# Patient Record
Sex: FEMALE | Race: WHITE | Employment: OTHER | URBAN - METROPOLITAN AREA
[De-identification: names, ages, dates, MRNs, and addresses within clinical notes are randomized per-mention and may not be internally consistent; named-entity substitution may affect disease eponyms.]

---

## 2017-05-23 ENCOUNTER — HOSPITAL ENCOUNTER (OUTPATIENT)
Dept: MAMMOGRAPHY | Age: 72
Discharge: HOME | End: 2017-05-23
Attending: INTERNAL MEDICINE
Payer: MEDICARE

## 2017-05-23 DIAGNOSIS — Z12.31 ENCOUNTER FOR SCREENING MAMMOGRAM FOR BREAST CANCER: ICD-10-CM

## 2017-05-23 PROCEDURE — 77067 SCR MAMMO BI INCL CAD: CPT | Performed by: INTERNAL MEDICINE

## 2017-05-30 ENCOUNTER — APPOINTMENT (OUTPATIENT)
Dept: LAB | Facility: HOSPITAL | Age: 72
End: 2017-05-30
Attending: INTERNAL MEDICINE
Payer: MEDICARE

## 2017-05-30 DIAGNOSIS — E55.9 VITAMIN D DEFICIENCY: ICD-10-CM

## 2017-05-30 DIAGNOSIS — E78.00 PURE HYPERCHOLESTEROLEMIA: ICD-10-CM

## 2017-05-30 PROCEDURE — 80061 LIPID PANEL: CPT

## 2017-05-30 PROCEDURE — 80053 COMPREHEN METABOLIC PANEL: CPT

## 2017-05-30 PROCEDURE — 82306 VITAMIN D 25 HYDROXY: CPT

## 2017-05-30 PROCEDURE — 84443 ASSAY THYROID STIM HORMONE: CPT

## 2017-05-30 PROCEDURE — 36415 COLL VENOUS BLD VENIPUNCTURE: CPT

## 2017-06-05 ENCOUNTER — HOSPITAL ENCOUNTER (OUTPATIENT)
Dept: BONE DENSITY | Age: 72
Discharge: HOME | End: 2017-06-05
Attending: INTERNAL MEDICINE
Payer: MEDICARE

## 2017-06-05 DIAGNOSIS — M81.0 OSTEOPOROSIS: ICD-10-CM

## 2017-06-05 DIAGNOSIS — M81.0 OSTEOPOROSIS, UNSPECIFIED OSTEOPOROSIS TYPE, UNSPECIFIED PATHOLOGICAL FRACTURE PRESENCE: ICD-10-CM

## 2017-06-05 PROCEDURE — 77080 DXA BONE DENSITY AXIAL: CPT | Performed by: INTERNAL MEDICINE

## 2017-11-16 PROCEDURE — 88305 TISSUE EXAM BY PATHOLOGIST: CPT | Performed by: INTERNAL MEDICINE

## 2018-04-11 PROBLEM — M79.662 PAIN OF LEFT CALF: Status: ACTIVE | Noted: 2018-04-11

## 2018-08-14 ENCOUNTER — HOSPITAL ENCOUNTER (OUTPATIENT)
Dept: MAMMOGRAPHY | Age: 73
Discharge: HOME | End: 2018-08-14
Attending: INTERNAL MEDICINE
Payer: MEDICARE

## 2018-08-14 ENCOUNTER — HOSPITAL ENCOUNTER (OUTPATIENT)
Dept: MAMMOGRAPHY | Age: 73
End: 2018-08-14
Attending: INTERNAL MEDICINE
Payer: MEDICARE

## 2018-08-14 DIAGNOSIS — Z12.31 SCREENING MAMMOGRAM, ENCOUNTER FOR: ICD-10-CM

## 2018-08-14 PROCEDURE — 77063 BREAST TOMOSYNTHESIS BI: CPT | Performed by: INTERNAL MEDICINE

## 2018-08-14 PROCEDURE — 77067 SCR MAMMO BI INCL CAD: CPT | Performed by: INTERNAL MEDICINE

## 2018-08-27 RX ORDER — ACETAMINOPHEN 500 MG
500 TABLET ORAL EVERY 6 HOURS PRN
COMMUNITY
End: 2020-11-30

## 2018-08-31 NOTE — PAT NURSING NOTE
Faxed additional testing requirement to ,pcp adding Chest x ray requested by ,surgeon to testing requirements

## 2018-09-06 PROBLEM — M79.662 PAIN OF LEFT CALF: Status: RESOLVED | Noted: 2018-04-11 | Resolved: 2018-09-06

## 2018-09-06 PROCEDURE — 87081 CULTURE SCREEN ONLY: CPT | Performed by: INTERNAL MEDICINE

## 2018-09-21 ENCOUNTER — HOSPITAL ENCOUNTER (OUTPATIENT)
Dept: GENERAL RADIOLOGY | Facility: HOSPITAL | Age: 73
Discharge: HOME | End: 2018-09-21
Attending: INTERNAL MEDICINE
Payer: MEDICARE

## 2018-09-21 DIAGNOSIS — Z01.818 PRE-OP TESTING: ICD-10-CM

## 2018-09-21 PROCEDURE — 71046 X-RAY EXAM CHEST 2 VIEWS: CPT | Performed by: INTERNAL MEDICINE

## 2018-09-24 NOTE — DISCHARGE SUMMARY
Ortho Discharge Summary     Patient ID:  Marci Unger  [id number]  87 year old  12/16/1945      Admit Date: 9/27/2018    Discharge Date and Time: 9/28/2018    Attending Physician: Chivo Torres MD     Reason for admission: right knee DJD    Discharge Alona

## 2018-09-24 NOTE — CM/SW NOTE
SW contacted pt re: upcoming surgery and d/c planning. Pt states that she will have a Right TKA on 9/27. She lives with her , who will be able to assist as needed. She has needed DME- RW, RTS, and cane. Discussed Home PT vs OPPT.   Pt states that

## 2018-09-26 ENCOUNTER — ANESTHESIA EVENT (OUTPATIENT)
Dept: SURGERY | Facility: HOSPITAL | Age: 73
End: 2018-09-26

## 2018-09-27 ENCOUNTER — ANESTHESIA (OUTPATIENT)
Dept: SURGERY | Facility: HOSPITAL | Age: 73
End: 2018-09-27

## 2018-09-27 ENCOUNTER — HOSPITAL ENCOUNTER (INPATIENT)
Facility: HOSPITAL | Age: 73
LOS: 1 days | Discharge: HOME | DRG: 470 | End: 2018-09-28
Attending: ORTHOPAEDIC SURGERY | Admitting: ORTHOPAEDIC SURGERY
Payer: MEDICARE

## 2018-09-27 ENCOUNTER — APPOINTMENT (OUTPATIENT)
Dept: GENERAL RADIOLOGY | Facility: HOSPITAL | Age: 73
DRG: 470 | End: 2018-09-27
Attending: PHYSICIAN ASSISTANT
Payer: MEDICARE

## 2018-09-27 PROBLEM — Z96.659 S/P KNEE REPLACEMENT: Status: ACTIVE | Noted: 2018-09-27

## 2018-09-27 PROCEDURE — 80053 COMPREHEN METABOLIC PANEL: CPT | Performed by: PHYSICIAN ASSISTANT

## 2018-09-27 PROCEDURE — 88311 DECALCIFY TISSUE: CPT | Performed by: ORTHOPAEDIC SURGERY

## 2018-09-27 PROCEDURE — 88305 TISSUE EXAM BY PATHOLOGIST: CPT | Performed by: ORTHOPAEDIC SURGERY

## 2018-09-27 PROCEDURE — 86850 RBC ANTIBODY SCREEN: CPT

## 2018-09-27 PROCEDURE — 0SRC0J9 REPLACEMENT OF RIGHT KNEE JOINT WITH SYNTHETIC SUBSTITUTE, CEMENTED, OPEN APPROACH: ICD-10-PCS | Performed by: ORTHOPAEDIC SURGERY

## 2018-09-27 PROCEDURE — 86901 BLOOD TYPING SEROLOGIC RH(D): CPT

## 2018-09-27 PROCEDURE — 86900 BLOOD TYPING SEROLOGIC ABO: CPT

## 2018-09-27 PROCEDURE — 73560 X-RAY EXAM OF KNEE 1 OR 2: CPT | Performed by: PHYSICIAN ASSISTANT

## 2018-09-27 RX ORDER — MORPHINE SULFATE 4 MG/ML
2 INJECTION, SOLUTION INTRAMUSCULAR; INTRAVENOUS EVERY 5 MIN PRN
Status: DISCONTINUED | OUTPATIENT
Start: 2018-09-27 | End: 2018-09-27 | Stop reason: HOSPADM

## 2018-09-27 RX ORDER — HYDROMORPHONE HYDROCHLORIDE 1 MG/ML
1 INJECTION, SOLUTION INTRAMUSCULAR; INTRAVENOUS; SUBCUTANEOUS EVERY 2 HOUR PRN
Status: DISCONTINUED | OUTPATIENT
Start: 2018-09-27 | End: 2018-09-28

## 2018-09-27 RX ORDER — ACETAMINOPHEN 500 MG
1000 TABLET ORAL ONCE
Status: DISCONTINUED | OUTPATIENT
Start: 2018-09-27 | End: 2018-09-27 | Stop reason: HOSPADM

## 2018-09-27 RX ORDER — DIPHENHYDRAMINE HCL 25 MG
25 CAPSULE ORAL EVERY 4 HOURS PRN
Status: DISCONTINUED | OUTPATIENT
Start: 2018-09-27 | End: 2018-09-28

## 2018-09-27 RX ORDER — HYDROMORPHONE HYDROCHLORIDE 2 MG/1
2 TABLET ORAL EVERY 4 HOURS PRN
Qty: 60 TABLET | Refills: 0 | Status: SHIPPED | OUTPATIENT
Start: 2018-09-27 | End: 2018-10-05

## 2018-09-27 RX ORDER — POLYETHYLENE GLYCOL 3350 17 G/17G
17 POWDER, FOR SOLUTION ORAL DAILY PRN
Status: DISCONTINUED | OUTPATIENT
Start: 2018-09-27 | End: 2018-09-28

## 2018-09-27 RX ORDER — PANTOPRAZOLE SODIUM 40 MG/1
40 TABLET, DELAYED RELEASE ORAL
Qty: 30 TABLET | Refills: 0 | Status: SHIPPED | OUTPATIENT
Start: 2018-09-27 | End: 2019-09-09

## 2018-09-27 RX ORDER — DIPHENHYDRAMINE HYDROCHLORIDE 50 MG/ML
12.5 INJECTION INTRAMUSCULAR; INTRAVENOUS AS NEEDED
Status: DISCONTINUED | OUTPATIENT
Start: 2018-09-27 | End: 2018-09-27 | Stop reason: HOSPADM

## 2018-09-27 RX ORDER — CEFAZOLIN SODIUM/WATER 2 G/20 ML
2 SYRINGE (ML) INTRAVENOUS EVERY 8 HOURS
Status: COMPLETED | OUTPATIENT
Start: 2018-09-27 | End: 2018-09-28

## 2018-09-27 RX ORDER — SODIUM PHOSPHATE, DIBASIC AND SODIUM PHOSPHATE, MONOBASIC 7; 19 G/133ML; G/133ML
1 ENEMA RECTAL ONCE AS NEEDED
Status: DISCONTINUED | OUTPATIENT
Start: 2018-09-27 | End: 2018-09-28

## 2018-09-27 RX ORDER — DOCUSATE SODIUM 100 MG/1
100 CAPSULE, LIQUID FILLED ORAL 2 TIMES DAILY
Qty: 60 CAPSULE | Refills: 2 | Status: SHIPPED | OUTPATIENT
Start: 2018-09-27 | End: 2019-09-09

## 2018-09-27 RX ORDER — MIDAZOLAM HYDROCHLORIDE 1 MG/ML
1 INJECTION INTRAMUSCULAR; INTRAVENOUS EVERY 5 MIN PRN
Status: DISCONTINUED | OUTPATIENT
Start: 2018-09-27 | End: 2018-09-27 | Stop reason: HOSPADM

## 2018-09-27 RX ORDER — IBUPROFEN 200 MG
200 TABLET ORAL
Status: ON HOLD | COMMUNITY
End: 2018-09-28

## 2018-09-27 RX ORDER — DIPHENHYDRAMINE HYDROCHLORIDE 50 MG/ML
25 INJECTION INTRAMUSCULAR; INTRAVENOUS ONCE AS NEEDED
Status: ACTIVE | OUTPATIENT
Start: 2018-09-27 | End: 2018-09-27

## 2018-09-27 RX ORDER — OXYCODONE HYDROCHLORIDE 5 MG/1
TABLET ORAL EVERY 4 HOURS PRN
Qty: 60 TABLET | Refills: 0 | Status: SHIPPED | OUTPATIENT
Start: 2018-09-27 | End: 2018-08-28

## 2018-09-27 RX ORDER — HYDROMORPHONE HYDROCHLORIDE 2 MG/1
2 TABLET ORAL EVERY 4 HOURS PRN
Status: DISCONTINUED | OUTPATIENT
Start: 2018-09-27 | End: 2018-09-28

## 2018-09-27 RX ORDER — CEFAZOLIN SODIUM/WATER 2 G/20 ML
2 SYRINGE (ML) INTRAVENOUS ONCE
Status: COMPLETED | OUTPATIENT
Start: 2018-09-27 | End: 2018-09-27

## 2018-09-27 RX ORDER — ASPIRIN 325 MG
325 TABLET ORAL 2 TIMES DAILY
Status: DISCONTINUED | OUTPATIENT
Start: 2018-09-27 | End: 2018-09-28

## 2018-09-27 RX ORDER — ONDANSETRON 2 MG/ML
4 INJECTION INTRAMUSCULAR; INTRAVENOUS EVERY 4 HOURS PRN
Status: DISCONTINUED | OUTPATIENT
Start: 2018-09-27 | End: 2018-09-28

## 2018-09-27 RX ORDER — METOCLOPRAMIDE HYDROCHLORIDE 5 MG/ML
10 INJECTION INTRAMUSCULAR; INTRAVENOUS EVERY 6 HOURS PRN
Status: DISCONTINUED | OUTPATIENT
Start: 2018-09-27 | End: 2018-09-28

## 2018-09-27 RX ORDER — NALOXONE HYDROCHLORIDE 0.4 MG/ML
80 INJECTION, SOLUTION INTRAMUSCULAR; INTRAVENOUS; SUBCUTANEOUS AS NEEDED
Status: DISCONTINUED | OUTPATIENT
Start: 2018-09-27 | End: 2018-09-27 | Stop reason: HOSPADM

## 2018-09-27 RX ORDER — SODIUM CHLORIDE, SODIUM LACTATE, POTASSIUM CHLORIDE, CALCIUM CHLORIDE 600; 310; 30; 20 MG/100ML; MG/100ML; MG/100ML; MG/100ML
INJECTION, SOLUTION INTRAVENOUS CONTINUOUS
Status: DISCONTINUED | OUTPATIENT
Start: 2018-09-27 | End: 2018-09-28

## 2018-09-27 RX ORDER — DIPHENHYDRAMINE HYDROCHLORIDE 50 MG/ML
12.5 INJECTION INTRAMUSCULAR; INTRAVENOUS EVERY 4 HOURS PRN
Status: DISCONTINUED | OUTPATIENT
Start: 2018-09-27 | End: 2018-09-28

## 2018-09-27 RX ORDER — BISACODYL 10 MG
10 SUPPOSITORY, RECTAL RECTAL
Status: DISCONTINUED | OUTPATIENT
Start: 2018-09-27 | End: 2018-09-28

## 2018-09-27 RX ORDER — ASPIRIN 325 MG
325 TABLET, DELAYED RELEASE (ENTERIC COATED) ORAL 2 TIMES DAILY
Qty: 60 TABLET | Refills: 0 | Status: SHIPPED | OUTPATIENT
Start: 2018-09-27 | End: 2020-09-21 | Stop reason: ALTCHOICE

## 2018-09-27 RX ORDER — HYDROMORPHONE HYDROCHLORIDE 4 MG/1
4 TABLET ORAL EVERY 4 HOURS PRN
Status: DISCONTINUED | OUTPATIENT
Start: 2018-09-27 | End: 2018-09-28

## 2018-09-27 RX ORDER — ONDANSETRON 2 MG/ML
4 INJECTION INTRAMUSCULAR; INTRAVENOUS AS NEEDED
Status: DISCONTINUED | OUTPATIENT
Start: 2018-09-27 | End: 2018-09-27 | Stop reason: HOSPADM

## 2018-09-27 RX ORDER — MELATONIN
325
Status: DISCONTINUED | OUTPATIENT
Start: 2018-09-28 | End: 2018-09-28

## 2018-09-27 RX ORDER — HYDROMORPHONE HYDROCHLORIDE 2 MG/1
TABLET ORAL EVERY 4 HOURS PRN
Status: DISCONTINUED | OUTPATIENT
Start: 2018-09-27 | End: 2018-09-27 | Stop reason: SDUPTHER

## 2018-09-27 RX ORDER — ACETAMINOPHEN 325 MG/1
650 TABLET ORAL EVERY 6 HOURS SCHEDULED
Status: DISCONTINUED | OUTPATIENT
Start: 2018-09-27 | End: 2018-09-28

## 2018-09-27 RX ORDER — SODIUM CHLORIDE, SODIUM LACTATE, POTASSIUM CHLORIDE, CALCIUM CHLORIDE 600; 310; 30; 20 MG/100ML; MG/100ML; MG/100ML; MG/100ML
INJECTION, SOLUTION INTRAVENOUS CONTINUOUS
Status: DISCONTINUED | OUTPATIENT
Start: 2018-09-27 | End: 2018-09-27 | Stop reason: HOSPADM

## 2018-09-27 RX ORDER — HYDROMORPHONE HYDROCHLORIDE 1 MG/ML
0.5 INJECTION, SOLUTION INTRAMUSCULAR; INTRAVENOUS; SUBCUTANEOUS EVERY 2 HOUR PRN
Status: DISCONTINUED | OUTPATIENT
Start: 2018-09-27 | End: 2018-09-28

## 2018-09-27 RX ORDER — ONDANSETRON 4 MG/1
4 TABLET, FILM COATED ORAL EVERY 8 HOURS PRN
Qty: 30 TABLET | Refills: 0 | Status: SHIPPED | OUTPATIENT
Start: 2018-09-27 | End: 2018-10-05

## 2018-09-27 RX ORDER — DOCUSATE SODIUM 100 MG/1
100 CAPSULE, LIQUID FILLED ORAL 2 TIMES DAILY
Status: DISCONTINUED | OUTPATIENT
Start: 2018-09-27 | End: 2018-09-28

## 2018-09-27 NOTE — ANESTHESIA PREPROCEDURE EVALUATION
PRE-OP EVALUATION    Patient Name: Heidi Dunn    Pre-op Diagnosis: RIGHT KNEE DEGENERATIVE JOINT DISEASE     Procedure(s):  RIGHT TOTAL KNEE ARTHROPLASTY    Surgeon(s) and Role: Kike Nguyen MD - Primary    Pre-op vitals reviewed.         Body m Surgeon: Savanna Todd MD;                 Location: [address] [place] [address]  2003: COLONOSCOPY & POLYPECTOMY  11/16/2017: COLONOSCOPY, POSSIBLE BIOPSY, POSSIBLE POLYPECTOMY [id number];    N/A      Comment:  Performed by Savanna Todd MD at [address] [facility]

## 2018-09-27 NOTE — OPERATIVE REPORT
[address] [place] [address]\R OPERATIVE REPORT\b PATIENT NAME: Augusta West MR#: [id number]\N PMD: Brandon Vergara DATE OF PROCEDURE: 9/27/2018\b PREOPERATIVE DIAGNOSIS: Degenerative Arthritis right knee\b POSTOPERATIVE Thursday, September 27, 2018. Following proper identification in the preoperative holding area with confirmation of the above-stated procedure, the patient was brought to the main operating room suite. Procedure was confirmed.  She received 2 grams of Cefaz medial and middle third of the tibial tubercle. This was provisionally pinned. A size 6 right EMPOWR 3D Non-Porous Femur femoral component was placed along with a 10 mm polyethylene spacer.  With this in place, the knee was able to reach full extension and

## 2018-09-27 NOTE — PLAN OF CARE
Pt arrived from PACU on bed. VSS. Rates pain severe, called pharmacy to verify and send IV Dilaudid. Voided and had BM upon arrival to floor via bed pain. Denies nausea. Family at bedside. Oriented to room and call light system.  Educated on fall precaution

## 2018-09-27 NOTE — H&P
History and Physical: Brief Update    I have reviewed the history and physical performed within the last 30 days, Dr. Crispin Mays on 9/6/18. I agree with this assessment and have no further additions. The consent form is signed and present in the chart.   Flores Rodriguez

## 2018-09-27 NOTE — ANESTHESIA POSTPROCEDURE EVALUATION
[facility] Patient Status:  Outpatient in a Bed   Age/Gender 71year old female MRN [id number]   Location [facility] Attending Jose A Felix MD   Hosp Day # 0 PCP Lacy Dixon MD       Anesthesia Post-op

## 2018-09-28 VITALS
HEIGHT: 68.5 IN | BODY MASS INDEX: 36.79 KG/M2 | SYSTOLIC BLOOD PRESSURE: 135 MMHG | HEART RATE: 78 BPM | TEMPERATURE: 98 F | WEIGHT: 245.56 LBS | OXYGEN SATURATION: 92 % | DIASTOLIC BLOOD PRESSURE: 76 MMHG | RESPIRATION RATE: 18 BRPM

## 2018-09-28 PROCEDURE — 97161 PT EVAL LOW COMPLEX 20 MIN: CPT

## 2018-09-28 PROCEDURE — 90471 IMMUNIZATION ADMIN: CPT

## 2018-09-28 PROCEDURE — 85027 COMPLETE CBC AUTOMATED: CPT | Performed by: PHYSICIAN ASSISTANT

## 2018-09-28 PROCEDURE — 97150 GROUP THERAPEUTIC PROCEDURES: CPT

## 2018-09-28 PROCEDURE — 97116 GAIT TRAINING THERAPY: CPT

## 2018-09-28 PROCEDURE — 97530 THERAPEUTIC ACTIVITIES: CPT

## 2018-09-28 NOTE — PHYSICAL THERAPY NOTE
PHYSICAL THERAPY KNEE EVALUATION - INPATIENT     Room Number: 368/368-A  Evaluation Date: 9/28/2018  Type of Evaluation: Initial  Physician Order: PT Eval and Treat    Presenting Problem: R TKA  Reason for Therapy: Mobility Dysfunction and Discharge Planni spouse. SUBJECTIVE  \"I'm over the crippling fear I had yesterday and ready to move\"    Patient self-stated goal is go home.     OBJECTIVE  Precautions: None  Fall Risk: High fall risk    WEIGHT BEARING RESTRICTION  Weight Bearing Restriction: R lower e Scale): 56.93   CMS Modifier (G-Code): CI    FUNCTIONAL ABILITY STATUS  Gait Assessment   Gait Assistance: Minimum assistance(actual: CGA)  Distance (ft): 150ft x2  Assistive Device: Rolling walker  Pattern: R Decreased stance time  Stoop/Curb Assistance: motion;Strengthening;Stair training;Transfer training;Balance training;Stoop training  Rehab Potential : Good  Frequency (Obs): BID  Number of Visits to Meet Established Goals: 3      CURRENT GOALS   Goal #1    Patient is able to demonstrate supine - sit E

## 2018-09-28 NOTE — CM/SW NOTE
09/28/18 1000   Discharge disposition   Expected discharge disposition Home or Self   Outpatient services Physical therapy   Discharge transportation Private car

## 2018-09-28 NOTE — CM/SW NOTE
Spoke with pt- she notes that she will go to OPPT and not have home PT first.  She has appt set to start on Monday. She voices some concern about the dressing change at home but  reassured her that he will take care of this.     [facility]- Jennifer notif

## 2018-09-28 NOTE — PROGRESS NOTES
Reviewed indications, side effects of pain medication/narcotics and constipation prevention.  Stressed importance of increased fluids/roughage in diet, continued use of stool softeners along with laxatives and suppositories as needed while taking narcotics

## 2018-09-28 NOTE — PLAN OF CARE
Pt discharge education completed with pt and spouse. All questions addressed. All pt belongings packed and sent with pt. Scripts delivered by [employer]. Discharged home with Outpt PT via personal car.

## 2018-09-28 NOTE — PHYSICAL THERAPY NOTE
PHYSICAL THERAPY KNEE TREATMENT NOTE - INPATIENT     Room Number: 368/368-A     Session: 1   Number of Visits to Meet Established Goals: 3    Presenting Problem: R TKA  Reason for Therapy: Mobility Dysfunction and Discharge Planning     History related to extremity        R Lower Extremity: Weight Bearing as Tolerated       PAIN ASSESSMENT   Ratin  Location: R knee  Management Techniques: Activity promotion; Body mechanics    BALANCE  Static Sitting: Good  Dynamic Sitting: Good  Static [facility]  Dy reps  reps   SLR 10 reps  reps   Sitting Knee Flexion 10 reps  reps   Standing heel/toe raises 10 reps  reps   Standing knee flexion 10 reps  reps   Extension stretch  1x 1x     Comments: Pt participated in group session, tolerance was good.    was pre

## 2018-09-28 NOTE — CONSULTS
[facility] hospitalist initial consult note  PCP Brittney Payne MD  Consulted at the request of Dr. Daniele Ricardo  Reason for consult medical co-management  HPI 72 yo female with many medical problems including but not limited to OA, anemia here s/p right cemented total kn Marital status:       Spouse name: Not on file      Number of children: 2      Years of education: Not on file      Highest education level: Not on file    Social Needs      Financial resource strain: Not on file      Food insecurity - worry: Not mg by mouth every 4 to 6 hours as needed for Pain. Disp:  Rfl:    Cholecalciferol (VITAMIN D) 1000 UNITS Oral Tab Take 1 tablet by mouth daily.  Disp:  Rfl:    Calcium Carbonate-Vitamin D (CALCIUM-D) 600-400 MG-UNIT Oral Tab Take by mouth 2 (two) times artie

## 2018-09-28 NOTE — PROGRESS NOTES
[facility]    Progress Note    Ross Salmon Patient Status:  Outpatient in a Bed    1945 MRN [id number]   [facility] 3SW-A Attending Teri Shields MD   Hosp Day # 0 PCP Adolfo Leahy MD       Subjective:   Ross Salmon is a(n) documentation in H+P. Based on patients current state of illness, I anticipate that, after discharge, patient will require TBD.         Alvin Erickson MD  9/28/2018    Dr. Alvin Erickson MD  Adult Joint Replacement and Reconstruction  [facility]

## 2018-10-01 ENCOUNTER — HOSPITAL ENCOUNTER (OUTPATIENT)
Dept: PHYSICAL THERAPY | Facility: HOSPITAL | Age: 73
Setting detail: THERAPIES SERIES
Discharge: HOME | End: 2018-10-01
Attending: ORTHOPAEDIC SURGERY
Payer: MEDICARE

## 2018-10-01 PROCEDURE — 97163 PT EVAL HIGH COMPLEX 45 MIN: CPT

## 2018-10-01 NOTE — PROGRESS NOTES
LOWER EXTREMITY EVALUATION:   Referring Physician: Dr. Kary Pineda  Diagnosis: s/p R TKA on 9/27/18     Date of Service: 10/1/2018     PATIENT SUMMARY   Erika Diaz is a 71year old female presenting to PT with complaints of R knee pain s/p R TKA on 9/27/18 126  Extension: R 15; L 4        Strength/MMT:   Hip Knee Foot/Ankle   Flexion: R 3/5; L 4-/5  Abduction: R 5/5; L 5/5 Flexion: R 4/5; L 5/5  Extension: R 4/5; L 5/5    DF: R 4+/5; L 4+/5     Balance: SLS: R 0 sec, L 1 sec    Today’s Treatment and Response [phone]    Sincerely,  Electronically signed by therapist: Aristeo Latham, PT    [de-identified] certification required: Yes  I certify the need for these services furnished under this plan of treatment and while under my care.     X______________________

## 2018-10-02 ENCOUNTER — HOSPITAL ENCOUNTER (OUTPATIENT)
Dept: PHYSICAL THERAPY | Facility: HOSPITAL | Age: 73
Setting detail: THERAPIES SERIES
Discharge: HOME | End: 2018-10-02
Attending: ORTHOPAEDIC SURGERY
Payer: MEDICARE

## 2018-10-02 PROCEDURE — 97140 MANUAL THERAPY 1/> REGIONS: CPT

## 2018-10-02 PROCEDURE — 97110 THERAPEUTIC EXERCISES: CPT

## 2018-10-02 NOTE — PROGRESS NOTES
Dx: s/p R TKA on 9/27/18         Authorized # of Visits:  Medicare         Next MD visit: none scheduled  Fall Risk: standard         Precautions: n/a             Subjective: Patient reports she had a lot of pain last night and some difficulty sleeping.

## 2018-10-04 ENCOUNTER — HOSPITAL ENCOUNTER (OUTPATIENT)
Dept: PHYSICAL THERAPY | Facility: HOSPITAL | Age: 73
Setting detail: THERAPIES SERIES
Discharge: HOME | End: 2018-10-04
Attending: ORTHOPAEDIC SURGERY
Payer: MEDICARE

## 2018-10-04 PROCEDURE — 97140 MANUAL THERAPY 1/> REGIONS: CPT

## 2018-10-04 PROCEDURE — 97110 THERAPEUTIC EXERCISES: CPT

## 2018-10-04 NOTE — PROGRESS NOTES
Dx: s/p R TKA on 9/27/18         Authorized # of Visits:  Medicare         Next MD visit: none scheduled  Fall Risk: standard         Precautions: n/a             Subjective: Patient reports she is feeling a little stiff this morning.     Objective:   TE mack

## 2018-10-05 ENCOUNTER — HOSPITAL ENCOUNTER (OUTPATIENT)
Dept: GENERAL RADIOLOGY | Age: 73
Discharge: HOME | End: 2018-10-05
Attending: INTERNAL MEDICINE
Payer: MEDICARE

## 2018-10-05 ENCOUNTER — HOSPITAL ENCOUNTER (OUTPATIENT)
Dept: PHYSICAL THERAPY | Facility: HOSPITAL | Age: 73
Setting detail: THERAPIES SERIES
Discharge: HOME | End: 2018-10-05
Attending: ORTHOPAEDIC SURGERY
Payer: MEDICARE

## 2018-10-05 DIAGNOSIS — R52 PAIN: ICD-10-CM

## 2018-10-05 PROCEDURE — 73630 X-RAY EXAM OF FOOT: CPT | Performed by: INTERNAL MEDICINE

## 2018-10-05 PROCEDURE — 97140 MANUAL THERAPY 1/> REGIONS: CPT

## 2018-10-05 PROCEDURE — 97110 THERAPEUTIC EXERCISES: CPT

## 2018-10-05 NOTE — PROGRESS NOTES
Dx: s/p R TKA on 9/27/18         Authorized # of Visits:  Medicare         Next MD visit: none scheduled  Fall Risk: standard         Precautions: n/a             Subjective: Patient reports she was walking on the sidewalk and twisted her L  foot yesterday tband hooklying clamshells 2x20       Ice with elevation x10' Ice with elevation x10' Ice with elevation x10'                Skilled Services: HEP in bold.     Charges: manual x1, therex x2       Total Timed Treatment: 40 min  Total Treatment Time: 50 min

## 2018-10-08 ENCOUNTER — APPOINTMENT (OUTPATIENT)
Dept: PHYSICAL THERAPY | Facility: HOSPITAL | Age: 73
End: 2018-10-08
Attending: ORTHOPAEDIC SURGERY
Payer: MEDICARE

## 2018-10-08 ENCOUNTER — HOSPITAL ENCOUNTER (OUTPATIENT)
Dept: PHYSICAL THERAPY | Facility: HOSPITAL | Age: 73
Setting detail: THERAPIES SERIES
Discharge: HOME | End: 2018-10-08
Attending: ORTHOPAEDIC SURGERY
Payer: MEDICARE

## 2018-10-08 PROBLEM — S92.355B: Status: ACTIVE | Noted: 2018-10-08

## 2018-10-08 PROCEDURE — 97110 THERAPEUTIC EXERCISES: CPT

## 2018-10-08 PROCEDURE — 97140 MANUAL THERAPY 1/> REGIONS: CPT

## 2018-10-08 NOTE — PROGRESS NOTES
Dx: s/p R TKA on 9/27/18         Authorized # of Visits:  Medicare         Next MD visit: none scheduled  Fall Risk: standard         Precautions: n/a             Subjective: Patient reports she had an x-ray of her left foot and met with a podiatrist and s SB DKTC x20 SB DKTC x20 SB DKTC x20 Patellar mobs all planes      SLR 2x10 SLR 2x10 SLR 2x10 Knee flexion PROM      Supine marching 2x1' Supine marching 2x1' Supine marching 2x1' Knee ext mobs grade 2-3      Quad sets over bolster 2x20 2 sec holds Quad s

## 2018-10-09 ENCOUNTER — HOSPITAL ENCOUNTER (OUTPATIENT)
Dept: PHYSICAL THERAPY | Facility: HOSPITAL | Age: 73
Setting detail: THERAPIES SERIES
Discharge: HOME | End: 2018-10-09
Attending: ORTHOPAEDIC SURGERY
Payer: MEDICARE

## 2018-10-09 PROCEDURE — 97110 THERAPEUTIC EXERCISES: CPT

## 2018-10-09 PROCEDURE — 97140 MANUAL THERAPY 1/> REGIONS: CPT

## 2018-10-09 PROCEDURE — 97016 VASOPNEUMATIC DEVICE THERAPY: CPT

## 2018-10-09 NOTE — PROGRESS NOTES
Dx: s/p R TKA on 9/27/18         Authorized # of Visits:  Medicare         Next MD visit: none scheduled  Fall Risk: standard         Precautions: n/a             Subjective: Patient states she is a little sore today because therapy was quite aggressive ye 2x20 2 sec holds Supine HS sets over bolster 2x20 2 sec holds Supine HS sets over bolster 2x20 2 sec holds Self gastroc stretch with DKSA strap 2x30\" Supine bridges 2x20     Ball hip adduction x20 2 sec holds Ball hip adduction x20 2 sec holds Ball hip ad

## 2018-10-11 ENCOUNTER — HOSPITAL ENCOUNTER (OUTPATIENT)
Dept: PHYSICAL THERAPY | Facility: HOSPITAL | Age: 73
Setting detail: THERAPIES SERIES
Discharge: HOME | End: 2018-10-11
Attending: ORTHOPAEDIC SURGERY
Payer: MEDICARE

## 2018-10-11 PROCEDURE — 97016 VASOPNEUMATIC DEVICE THERAPY: CPT

## 2018-10-11 PROCEDURE — 97110 THERAPEUTIC EXERCISES: CPT

## 2018-10-11 PROCEDURE — 97140 MANUAL THERAPY 1/> REGIONS: CPT

## 2018-10-11 NOTE — PROGRESS NOTES
Dx: s/p R TKA on 9/27/18         Authorized # of Visits:  Medicare         Next MD visit: none scheduled  Fall Risk: standard         Precautions: n/a             Subjective: Patient states she did not sleep well last night and is feeling sore today.     Ob sec holds Quad sets over bolster 2x20 2 sec holds 90/90 HSS 2x30\" Seated quad sets 2x20 2 sec holds Seated quad sets 2x20 2 sec holds    Supine HS sets over bolster 2x20 2 sec holds Supine HS sets over bolster 2x20 2 sec holds Supine HS sets over bolster

## 2018-10-15 ENCOUNTER — HOSPITAL ENCOUNTER (OUTPATIENT)
Dept: PHYSICAL THERAPY | Facility: HOSPITAL | Age: 73
Setting detail: THERAPIES SERIES
Discharge: HOME | End: 2018-10-15
Attending: ORTHOPAEDIC SURGERY
Payer: MEDICARE

## 2018-10-15 PROCEDURE — 97140 MANUAL THERAPY 1/> REGIONS: CPT

## 2018-10-15 PROCEDURE — 97110 THERAPEUTIC EXERCISES: CPT

## 2018-10-15 PROCEDURE — 97116 GAIT TRAINING THERAPY: CPT

## 2018-10-15 PROCEDURE — 97016 VASOPNEUMATIC DEVICE THERAPY: CPT

## 2018-10-15 NOTE — PROGRESS NOTES
Dx: s/p R TKA on 9/27/18         Authorized # of Visits:  Medicare         Next MD visit: none scheduled  Fall Risk: standard         Precautions: n/a             Subjective: Patient reports she has been having trouble transitioning to the cane instead of marching 2x1' Seated marching 2x1' Seated marching 2x1'   Quad sets over bolster 2x20 2 sec holds Quad sets over bolster 2x20 2 sec holds Quad sets over bolster 2x20 2 sec holds 90/90 HSS 2x30\" Seated quad sets 2x20 2 sec holds Seated quad sets 2x20 2 sec

## 2018-10-16 ENCOUNTER — HOSPITAL ENCOUNTER (OUTPATIENT)
Dept: PHYSICAL THERAPY | Facility: HOSPITAL | Age: 73
Setting detail: THERAPIES SERIES
Discharge: HOME | End: 2018-10-16
Attending: INTERNAL MEDICINE
Payer: MEDICARE

## 2018-10-16 PROCEDURE — 97140 MANUAL THERAPY 1/> REGIONS: CPT

## 2018-10-16 PROCEDURE — 97112 NEUROMUSCULAR REEDUCATION: CPT

## 2018-10-16 PROCEDURE — 97110 THERAPEUTIC EXERCISES: CPT

## 2018-10-16 NOTE — PROGRESS NOTES
Dx: s/p R TKA on 9/27/18         Authorized # of Visits:  Medicare         Next MD visit: none scheduled  Fall Risk: standard         Precautions: n/a             Subjective: Patient reports she has been practicing using the cane in her R hand and feels mu x20   SLR 2x10 SLR 2x10 SLR 2x10 Knee flexion PROM SLR 2x20 Seated SLR 2x20     Supine marching 2x1' Supine marching 2x1' Supine marching 2x1' Knee ext mobs grade 2-3 Seated marching 2x1' Seated marching 2x1' Seated marching 2x1'    Quad sets over bolster

## 2018-10-18 ENCOUNTER — APPOINTMENT (OUTPATIENT)
Dept: PHYSICAL THERAPY | Facility: HOSPITAL | Age: 73
End: 2018-10-18
Payer: MEDICARE

## 2018-10-19 ENCOUNTER — HOSPITAL ENCOUNTER (OUTPATIENT)
Dept: PHYSICAL THERAPY | Facility: HOSPITAL | Age: 73
Setting detail: THERAPIES SERIES
Discharge: HOME | End: 2018-10-19
Attending: INTERNAL MEDICINE
Payer: MEDICARE

## 2018-10-19 PROCEDURE — 97110 THERAPEUTIC EXERCISES: CPT

## 2018-10-19 PROCEDURE — 97112 NEUROMUSCULAR REEDUCATION: CPT

## 2018-10-19 PROCEDURE — 97140 MANUAL THERAPY 1/> REGIONS: CPT

## 2018-10-19 PROCEDURE — 97016 VASOPNEUMATIC DEVICE THERAPY: CPT

## 2018-10-19 NOTE — PROGRESS NOTES
Dx: s/p R TKA on 9/27/18         Authorized # of Visits:  Medicare         Next MD visit: none scheduled  Fall Risk: standard         Precautions: n/a             Subjective: Patient reports she is having difficulty sleeping at night due to pain in the kne SB DKTC x20   SLR 2x10 SLR 2x10 SLR 2x10 Knee flexion PROM SLR 2x20 Seated SLR 2x20      Supine marching 2x1' Supine marching 2x1' Supine marching 2x1' Knee ext mobs grade 2-3 Seated marching 2x1' Seated marching 2x1' Seated marching 2x1'     Quad sets ove sidestepping, backwards walking x10' total   Skilled Services: HEP in bold.     Charges: manual x1, therex x1, neuro x1, game ready x1       Total Timed Treatment: 45 min  Total Treatment Time: 60 min

## 2018-10-23 ENCOUNTER — HOSPITAL ENCOUNTER (OUTPATIENT)
Dept: PHYSICAL THERAPY | Facility: HOSPITAL | Age: 73
Setting detail: THERAPIES SERIES
Discharge: HOME | End: 2018-10-23
Attending: INTERNAL MEDICINE
Payer: MEDICARE

## 2018-10-23 PROCEDURE — 97140 MANUAL THERAPY 1/> REGIONS: CPT

## 2018-10-23 PROCEDURE — 97112 NEUROMUSCULAR REEDUCATION: CPT

## 2018-10-23 PROCEDURE — 97110 THERAPEUTIC EXERCISES: CPT

## 2018-10-23 PROCEDURE — 97016 VASOPNEUMATIC DEVICE THERAPY: CPT

## 2018-10-23 NOTE — PROGRESS NOTES
Dx: s/p R TKA on 9/27/18         Authorized # of Visits:  Medicare         Next MD visit: none scheduled  Fall Risk: standard         Precautions: n/a             Subjective: Patient reports she over-exerted herself over the weekend by going on a mile long x15' total Manual knee flexion and extension stretching x15' total   SB DKTC x20 SB DKTC x20 SB DKTC x20 Patellar mobs all planes SB DKTC x20 SB DKTC x20 SB DKTC x20 SB DKTC x20 SB DKTC x20 SB DKTC x20   SLR 2x10 SLR 2x10 SLR 2x10 Knee flexion PROM SLR 2x2 Static balance ex: tandem stance x4' total Static balance ex: tandem stance x4' total          Ball throw and catch in tandem stance x3'            airex marching x1' airex marching x1' airex marching 2x1'  airex step ups x20          Dynamic balance ex: h

## 2018-10-25 ENCOUNTER — APPOINTMENT (OUTPATIENT)
Dept: PHYSICAL THERAPY | Facility: HOSPITAL | Age: 73
End: 2018-10-25
Payer: MEDICARE

## 2018-10-30 ENCOUNTER — APPOINTMENT (OUTPATIENT)
Dept: PHYSICAL THERAPY | Facility: HOSPITAL | Age: 73
End: 2018-10-30
Payer: MEDICARE

## 2018-11-01 ENCOUNTER — APPOINTMENT (OUTPATIENT)
Dept: PHYSICAL THERAPY | Facility: HOSPITAL | Age: 73
End: 2018-11-01
Payer: MEDICARE

## 2018-11-06 ENCOUNTER — APPOINTMENT (OUTPATIENT)
Dept: PHYSICAL THERAPY | Facility: HOSPITAL | Age: 73
End: 2018-11-06
Payer: MEDICARE

## 2018-11-08 ENCOUNTER — APPOINTMENT (OUTPATIENT)
Dept: PHYSICAL THERAPY | Facility: HOSPITAL | Age: 73
End: 2018-11-08
Payer: MEDICARE

## 2019-04-30 ENCOUNTER — LAB ENCOUNTER (OUTPATIENT)
Dept: LAB | Facility: HOSPITAL | Age: 74
End: 2019-04-30
Attending: PHYSICIAN ASSISTANT
Payer: MEDICARE

## 2019-04-30 DIAGNOSIS — E55.9 VITAMIN D DEFICIENCY: ICD-10-CM

## 2019-04-30 DIAGNOSIS — Z87.39 HISTORY OF OSTEOPOROSIS: ICD-10-CM

## 2019-04-30 DIAGNOSIS — E34.9 INCREASED PTH LEVEL: ICD-10-CM

## 2019-04-30 PROCEDURE — 80053 COMPREHEN METABOLIC PANEL: CPT

## 2019-04-30 PROCEDURE — 36415 COLL VENOUS BLD VENIPUNCTURE: CPT

## 2019-04-30 PROCEDURE — 83970 ASSAY OF PARATHORMONE: CPT

## 2019-04-30 PROCEDURE — 82306 VITAMIN D 25 HYDROXY: CPT

## 2019-05-21 NOTE — PROGRESS NOTES
Lab results reviewed. Calcium and vitamin D labs are within normal limits. Patient to continue current vitamin D supplement long term. PTH level normalized with vitamin D supplementation.

## 2019-06-18 PROBLEM — N39.3 STRESS INCONTINENCE: Status: ACTIVE | Noted: 2019-06-18

## 2019-06-18 PROBLEM — N32.81 OAB (OVERACTIVE BLADDER): Status: ACTIVE | Noted: 2019-06-18

## 2019-06-18 PROBLEM — N39.41 URGE INCONTINENCE: Status: ACTIVE | Noted: 2019-06-18

## 2019-09-09 PROBLEM — R60.0 LOCALIZED EDEMA: Status: ACTIVE | Noted: 2019-09-09

## 2019-09-09 PROBLEM — S92.355B: Status: RESOLVED | Noted: 2018-10-08 | Resolved: 2019-09-09

## 2019-09-09 PROBLEM — R12 HEARTBURN: Status: ACTIVE | Noted: 2019-09-09

## 2020-09-21 PROBLEM — N32.81 OAB (OVERACTIVE BLADDER): Status: RESOLVED | Noted: 2019-06-18 | Resolved: 2020-09-21

## 2020-09-21 PROBLEM — N39.41 URGE INCONTINENCE: Status: RESOLVED | Noted: 2019-06-18 | Resolved: 2020-09-21

## 2020-09-21 PROBLEM — N39.3 STRESS INCONTINENCE: Status: RESOLVED | Noted: 2019-06-18 | Resolved: 2020-09-21

## 2020-09-21 PROBLEM — R60.0 LOCALIZED EDEMA: Status: RESOLVED | Noted: 2019-09-09 | Resolved: 2020-09-21

## 2021-07-21 ENCOUNTER — ORDER TRANSCRIPTION (OUTPATIENT)
Dept: PHYSICAL THERAPY | Facility: HOSPITAL | Age: 76
End: 2021-07-21

## 2021-07-21 DIAGNOSIS — M75.101 ROTATOR CUFF SYNDROME OF RIGHT SHOULDER: ICD-10-CM

## 2021-07-21 DIAGNOSIS — M25.511 RIGHT SHOULDER PAIN, UNSPECIFIED CHRONICITY: Primary | ICD-10-CM

## 2021-08-13 ENCOUNTER — OFFICE VISIT (OUTPATIENT)
Dept: PHYSICAL THERAPY | Facility: HOSPITAL | Age: 76
End: 2021-08-13
Attending: ORTHOPAEDIC SURGERY
Payer: MEDICARE

## 2021-08-13 DIAGNOSIS — M75.101 ROTATOR CUFF SYNDROME OF RIGHT SHOULDER: ICD-10-CM

## 2021-08-13 DIAGNOSIS — M25.511 RIGHT SHOULDER PAIN, UNSPECIFIED CHRONICITY: ICD-10-CM

## 2021-08-13 PROCEDURE — 97162 PT EVAL MOD COMPLEX 30 MIN: CPT

## 2021-08-13 PROCEDURE — 97110 THERAPEUTIC EXERCISES: CPT

## 2021-08-13 NOTE — PROGRESS NOTES
SHOULDER EVALUATION:   Referring Physician: Dr. Manuel Mora  Diagnosis:  Right rotator cuff syndrome.       Date of Service: 8/13/2021     PATIENT SUMMARY   Jose Carmona is a 77year old female who presents to therapy today with complaints of patient ex reaching, lifting/carrying things, hand behind back and reaching out, and with UE dressing. Signs and symptoms are consistent with diagnosis of rtc impingement syndrome, r/o rtc tear. Pt and PT discussed evaluation findings, pathology, POC and HEP.   Pt vo 3+ personal factors/comorbidities, 4+ body structures involved/activity limitations, and unstable symptoms including changing pain levels.   PLAN OF CARE:    Goals: (to be met in 10 visits)   Improved right shoulder elevation ROM from baseline allowing repo

## 2021-08-16 ENCOUNTER — OFFICE VISIT (OUTPATIENT)
Dept: PHYSICAL THERAPY | Facility: HOSPITAL | Age: 76
End: 2021-08-16
Attending: ORTHOPAEDIC SURGERY
Payer: MEDICARE

## 2021-08-16 DIAGNOSIS — M75.101 ROTATOR CUFF SYNDROME OF RIGHT SHOULDER: ICD-10-CM

## 2021-08-16 DIAGNOSIS — M25.511 RIGHT SHOULDER PAIN, UNSPECIFIED CHRONICITY: ICD-10-CM

## 2021-08-16 PROCEDURE — 97110 THERAPEUTIC EXERCISES: CPT

## 2021-08-16 PROCEDURE — 97140 MANUAL THERAPY 1/> REGIONS: CPT

## 2021-08-16 NOTE — PROGRESS NOTES
Dx: Right rotator cuff syndrome.           Insurance (Authorized # of Visits):  8           Authorizing Physician: Dr. Lyudmila Holland  Next MD visit: none scheduled  Fall Risk: standard         Precautions: n/a             Subjective: Right shoulder pain has r

## 2021-08-20 ENCOUNTER — OFFICE VISIT (OUTPATIENT)
Dept: PHYSICAL THERAPY | Facility: HOSPITAL | Age: 76
End: 2021-08-20
Attending: ORTHOPAEDIC SURGERY
Payer: MEDICARE

## 2021-08-20 DIAGNOSIS — M25.511 RIGHT SHOULDER PAIN, UNSPECIFIED CHRONICITY: ICD-10-CM

## 2021-08-20 DIAGNOSIS — M75.101 ROTATOR CUFF SYNDROME OF RIGHT SHOULDER: ICD-10-CM

## 2021-08-20 PROCEDURE — 97140 MANUAL THERAPY 1/> REGIONS: CPT

## 2021-08-20 PROCEDURE — 97110 THERAPEUTIC EXERCISES: CPT

## 2021-08-20 NOTE — PROGRESS NOTES
Dx: Right rotator cuff syndrome.           Insurance (Authorized # of Visits):  8           Authorizing Physician: Dr. Amarjit Freire  Next MD visit: none scheduled  Fall Risk: standard         Precautions: n/a             Subjective: Right shoulder pain level HEP: supine IR/ER actively and with wand, self inferior gliding, AA SF supine, and B SR. Charges: Terrence Templeton.        Total Timed Treatment: 40 min  Total Treatment Time: 40 min

## 2021-08-23 ENCOUNTER — OFFICE VISIT (OUTPATIENT)
Dept: PHYSICAL THERAPY | Facility: HOSPITAL | Age: 76
End: 2021-08-23
Attending: ORTHOPAEDIC SURGERY
Payer: MEDICARE

## 2021-08-23 DIAGNOSIS — M25.511 RIGHT SHOULDER PAIN, UNSPECIFIED CHRONICITY: ICD-10-CM

## 2021-08-23 DIAGNOSIS — M75.101 ROTATOR CUFF SYNDROME OF RIGHT SHOULDER: ICD-10-CM

## 2021-08-23 PROCEDURE — 97140 MANUAL THERAPY 1/> REGIONS: CPT

## 2021-08-23 PROCEDURE — 97110 THERAPEUTIC EXERCISES: CPT

## 2021-08-23 NOTE — PROGRESS NOTES
Dx: Right rotator cuff syndrome.           Insurance (Authorized # of Visits):  8           Authorizing Physician: Dr. Jagjit Mast  Next MD visit: none scheduled  Fall Risk: standard         Precautions: n/a             Subjective: Right shoulder pain is a capsule stretching by PT. Supine right shoulder posterior capsule stretching by PT. Supine isometric R IR and ER gently:  5 second holds 2 x 8 each direction. Gentle isometric IR and ER while supine:   5 second holds 2 x 8 each direction R. ..  Supine

## 2021-08-27 ENCOUNTER — OFFICE VISIT (OUTPATIENT)
Dept: PHYSICAL THERAPY | Facility: HOSPITAL | Age: 76
End: 2021-08-27
Attending: ORTHOPAEDIC SURGERY
Payer: MEDICARE

## 2021-08-27 DIAGNOSIS — M75.101 ROTATOR CUFF SYNDROME OF RIGHT SHOULDER: ICD-10-CM

## 2021-08-27 DIAGNOSIS — M25.511 RIGHT SHOULDER PAIN, UNSPECIFIED CHRONICITY: ICD-10-CM

## 2021-08-27 PROCEDURE — 97140 MANUAL THERAPY 1/> REGIONS: CPT

## 2021-08-27 PROCEDURE — 97110 THERAPEUTIC EXERCISES: CPT

## 2021-08-27 NOTE — PROGRESS NOTES
Dx: Right rotator cuff syndrome.           Insurance (Authorized # of Visits):  8           Authorizing Physician: Dr. Fransico Leroy  Next MD visit: none scheduled  Fall Risk: standard         Precautions: n/a             Subjective:  Slept well last night an [address] posterior and caudal mobs by PT. Grade II and III R GH posterior and caudal mobs by PT. Grade II and III R GH posterior and caudal mobs by PT.     R shoulder PROM by PT. PROM R shoulder by PT. Lots of PROM right shoulder by PT.   PROM right shoulder

## 2021-08-30 ENCOUNTER — OFFICE VISIT (OUTPATIENT)
Dept: PHYSICAL THERAPY | Facility: HOSPITAL | Age: 76
End: 2021-08-30
Attending: ORTHOPAEDIC SURGERY
Payer: MEDICARE

## 2021-08-30 DIAGNOSIS — M75.101 ROTATOR CUFF SYNDROME OF RIGHT SHOULDER: ICD-10-CM

## 2021-08-30 DIAGNOSIS — M25.511 RIGHT SHOULDER PAIN, UNSPECIFIED CHRONICITY: ICD-10-CM

## 2021-08-30 PROCEDURE — 97110 THERAPEUTIC EXERCISES: CPT

## 2021-08-30 PROCEDURE — 97140 MANUAL THERAPY 1/> REGIONS: CPT

## 2021-08-30 NOTE — PROGRESS NOTES
Dx: Right rotator cuff syndrome. Insurance (Authorized # of Visits):  8           Authorizing Physician: Dr. Marvin Blankenship MD visit: 9/15/21.   Fall Risk: standard         Precautions: n/a             Subjective:   Right shoulder pain is rated posterior and caudal mobs by PT.    R shoulder PROM by PT. PROM R shoulder by PT. Lots of PROM right shoulder by PT. PROM right shoulder by PT while supine. PROM R shoulder while supine. R shoulder posterior capsule stretching by PT.   Supine R shoul

## 2021-09-03 ENCOUNTER — OFFICE VISIT (OUTPATIENT)
Dept: PHYSICAL THERAPY | Facility: HOSPITAL | Age: 76
End: 2021-09-03
Attending: ORTHOPAEDIC SURGERY
Payer: MEDICARE

## 2021-09-03 DIAGNOSIS — M25.511 RIGHT SHOULDER PAIN, UNSPECIFIED CHRONICITY: ICD-10-CM

## 2021-09-03 DIAGNOSIS — M75.101 ROTATOR CUFF SYNDROME OF RIGHT SHOULDER: ICD-10-CM

## 2021-09-03 PROCEDURE — 97110 THERAPEUTIC EXERCISES: CPT

## 2021-09-03 PROCEDURE — 97140 MANUAL THERAPY 1/> REGIONS: CPT

## 2021-09-03 NOTE — PROGRESS NOTES
Dx: Right rotator cuff syndrome. Insurance (Authorized # of Visits):  8           Authorizing Physician: Dr. Haven Blankenship MD visit: 9/15/21.   Fall Risk: standard         Precautions: n/a             Subjective:   Right shoulder pain has not g and III R GH posterior and caudal mobs by PT. Grade II and III R GH posterior and caudal mobs by PT. Grade II and III R GH posterior and caudal mobs by PT. Grade II and III R GH posterior and caudal mobs by PT.       R shoulder PROM by PT.   PROM R shoul

## 2021-09-15 PROBLEM — M75.121 NONTRAUMATIC COMPLETE TEAR OF RIGHT ROTATOR CUFF: Status: ACTIVE | Noted: 2021-09-15

## 2021-10-04 ENCOUNTER — OFFICE VISIT (OUTPATIENT)
Dept: PHYSICAL THERAPY | Facility: HOSPITAL | Age: 76
End: 2021-10-04
Attending: ORTHOPAEDIC SURGERY
Payer: MEDICARE

## 2021-10-04 PROCEDURE — 97110 THERAPEUTIC EXERCISES: CPT

## 2021-10-04 PROCEDURE — 97140 MANUAL THERAPY 1/> REGIONS: CPT

## 2021-10-04 NOTE — PROGRESS NOTES
Dx: Right rotator cuff syndrome. Insurance (Authorized # of Visits):  8           Authorizing Physician: Dr. Haven Blankenship MD visit: 9/15/21.   Fall Risk: standard         Precautions: n/a             Subjective:   Right shoulder constant wilmer pectoralis tendon, biceps tendon, UT and LS mm.. X. X. stm right shoulder area by PT.  stm right shoulder area by PT. Grade II R GH posterior and caudal mobs by PT. Grade II and III R GH posterior and caudal mobs by PT.   Grade II and III R GH posteri

## 2021-10-06 ENCOUNTER — APPOINTMENT (OUTPATIENT)
Dept: PHYSICAL THERAPY | Facility: HOSPITAL | Age: 76
End: 2021-10-06
Attending: ORTHOPAEDIC SURGERY
Payer: MEDICARE

## 2021-10-06 ENCOUNTER — TELEPHONE (OUTPATIENT)
Dept: PHYSICAL THERAPY | Facility: HOSPITAL | Age: 76
End: 2021-10-06

## 2021-10-11 ENCOUNTER — APPOINTMENT (OUTPATIENT)
Dept: PHYSICAL THERAPY | Facility: HOSPITAL | Age: 76
End: 2021-10-11
Attending: ORTHOPAEDIC SURGERY
Payer: MEDICARE

## 2021-10-13 ENCOUNTER — OFFICE VISIT (OUTPATIENT)
Dept: PHYSICAL THERAPY | Facility: HOSPITAL | Age: 76
End: 2021-10-13
Attending: ORTHOPAEDIC SURGERY
Payer: MEDICARE

## 2021-10-13 PROCEDURE — 97140 MANUAL THERAPY 1/> REGIONS: CPT

## 2021-10-13 PROCEDURE — 97110 THERAPEUTIC EXERCISES: CPT

## 2021-10-13 NOTE — PROGRESS NOTES
Dx: Right rotator cuff syndrome. Insurance (Authorized # of Visits):  8           Authorizing Physician: Dr. Chip Blankenship MD visit: 9/15/21.   Fall Risk: standard         Precautions: n/a             Subjective:   Right shoulder constant wilmer tendon. stm right shoulder area by PT including pectoralis tendon, biceps tendon, UT and LS mm.. X. X. stm right shoulder area by PT.  stm right shoulder area by PT.  stm right shoulder area by PT. Grade II R GH posterior and caudal mobs by PT.   Grade yellow band use while standing:    B SR x 10. B SE x 10. R Codman's exercises; and as HEP.  HEP. R Codman's exercise. .. HEP.  HEP.                                       HEP: supine IR/ER actively and with wand, self inferior gliding, AA SF supine,

## 2021-10-14 PROBLEM — R73.01 IFG (IMPAIRED FASTING GLUCOSE): Status: ACTIVE | Noted: 2021-10-14

## 2021-10-14 PROBLEM — E66.01 OBESITY, MORBID (HCC): Status: ACTIVE | Noted: 2021-10-14

## 2021-10-27 PROBLEM — M84.375A FRACTURE, STRESS, METATARSAL, LEFT, INITIAL ENCOUNTER: Status: ACTIVE | Noted: 2021-10-27

## 2021-10-27 PROBLEM — M76.72 PERONEAL TENDINITIS OF LEFT LOWER EXTREMITY: Status: ACTIVE | Noted: 2021-10-27

## 2021-10-27 PROBLEM — S93.602A SPRAIN OF FOOT, LEFT, INITIAL ENCOUNTER: Status: ACTIVE | Noted: 2021-10-27

## 2021-10-28 ENCOUNTER — APPOINTMENT (OUTPATIENT)
Dept: PHYSICAL THERAPY | Facility: HOSPITAL | Age: 76
End: 2021-10-28
Attending: ORTHOPAEDIC SURGERY
Payer: MEDICARE

## 2021-10-28 ENCOUNTER — TELEPHONE (OUTPATIENT)
Dept: PHYSICAL THERAPY | Facility: HOSPITAL | Age: 76
End: 2021-10-28

## 2021-10-29 ENCOUNTER — APPOINTMENT (OUTPATIENT)
Dept: PHYSICAL THERAPY | Facility: HOSPITAL | Age: 76
End: 2021-10-29
Attending: ORTHOPAEDIC SURGERY
Payer: MEDICARE

## 2021-11-09 ENCOUNTER — OFFICE VISIT (OUTPATIENT)
Dept: PHYSICAL THERAPY | Facility: HOSPITAL | Age: 76
End: 2021-11-09
Attending: ORTHOPAEDIC SURGERY
Payer: MEDICARE

## 2021-11-09 PROCEDURE — 97140 MANUAL THERAPY 1/> REGIONS: CPT

## 2021-11-09 PROCEDURE — 97110 THERAPEUTIC EXERCISES: CPT

## 2021-11-15 ENCOUNTER — OFFICE VISIT (OUTPATIENT)
Dept: PHYSICAL THERAPY | Facility: HOSPITAL | Age: 76
End: 2021-11-15
Attending: ORTHOPAEDIC SURGERY
Payer: MEDICARE

## 2021-11-15 PROCEDURE — 97140 MANUAL THERAPY 1/> REGIONS: CPT

## 2021-11-15 PROCEDURE — 97110 THERAPEUTIC EXERCISES: CPT

## 2021-11-15 NOTE — PROGRESS NOTES
Dx: Right rotator cuff syndrome. Insurance (Authorized # of Visits):  8           Authorizing Physician: Dr. Susanne Blankenship MD visit: tbd. ..   Fall Risk: standard         Precautions: n/a              Subjective:   Right shoulder dull pain with 10/4/21  #8 10/13/21  #9 11/9/21  #10 11/15/21  #1/10 additional... Standing rolling SB into B SF x 10 reps. .. Standing rolling SB into B SF x 10 reps. . X 15 reps. .. X 20 reps. . X 20 reps. ..  Standing rolling SB on table into B SF x 20.  - Standing rolli stretching by PT. Supine right shoulder posterior capsule stretching by PT. Supine right shoulder posterior capsule stretching by PT. X. Supine R shoulder posterior capsule stretching by PT. Supine R shoulder posterior capsule stretching by PT.   Supine

## 2021-11-19 ENCOUNTER — APPOINTMENT (OUTPATIENT)
Dept: PHYSICAL THERAPY | Facility: HOSPITAL | Age: 76
End: 2021-11-19
Attending: ORTHOPAEDIC SURGERY
Payer: MEDICARE

## 2021-12-03 ENCOUNTER — OFFICE VISIT (OUTPATIENT)
Dept: PHYSICAL THERAPY | Facility: HOSPITAL | Age: 76
End: 2021-12-03
Attending: ORTHOPAEDIC SURGERY
Payer: MEDICARE

## 2021-12-03 PROCEDURE — 97140 MANUAL THERAPY 1/> REGIONS: CPT

## 2021-12-03 PROCEDURE — 97110 THERAPEUTIC EXERCISES: CPT

## 2021-12-03 NOTE — PROGRESS NOTES
Dx: Right rotator cuff syndrome. Insurance (Authorized # of Visits):  8           Authorizing Physician: Dr. Abdulaziz Blankenship MD visit: tbd. ..   Fall Risk: standard         Precautions: n/a              Subjective:   Right shoulder dull pain with x 10.  AA R PUP 2 x 10. AA supine R PUP 2 x 10. Supine AA protraction R 2 x 10.  2 x 10. Supine AA R protraction 2 x 10. Supine AA R protraction 2 x 10. Supine AA/A R protraction 2 x 10 with TC prn. . Supine A and AA R protraction 2 x 10.  2 x 10.   Sup posterior capsule stretching by PT. Supine R shoulder posterior capsule stretching by PT. Supine R shoulder posterior capsule stretching by PT. Supine R shoulder posterior capsule stretching by PT.   Supine right shoulder posterior capsule stretching by P

## 2021-12-06 ENCOUNTER — OFFICE VISIT (OUTPATIENT)
Dept: PHYSICAL THERAPY | Facility: HOSPITAL | Age: 76
End: 2021-12-06
Attending: ORTHOPAEDIC SURGERY
Payer: MEDICARE

## 2021-12-06 PROCEDURE — 97140 MANUAL THERAPY 1/> REGIONS: CPT

## 2021-12-06 PROCEDURE — 97110 THERAPEUTIC EXERCISES: CPT

## 2021-12-06 NOTE — PROGRESS NOTES
Dx: Right rotator cuff syndrome. Insurance (Authorized # of Visits):  8           Authorizing Physician: Dr. Rekha Blankenship MD visit: tbd. ..   Fall Risk: standard         Precautions: n/a              Subjective:   Noted feeling good on Friday a table B hands x 20 reps. . - X 20 reps. . Standing rolling SB on table x 20 reps. . X 20 reps. .    AA PUP R x 10. AA R PUP 2 x 10. AA supine R PUP 2 x 10. Supine AA protraction R 2 x 10.  2 x 10. Supine AA R protraction 2 x 10.   Supine AA R protraction 2 shoulder posterior capsule stretching by PT. Supine R shoulder posterior capsule stretching by PT. Supine right shoulder posterior capsule stretching by PT. Supine right shoulder posterior capsule stretching by PT.   X. Supine R shoulder posterior capsul Treatment: 40 min  Total Treatment Time: 40 min

## 2021-12-10 ENCOUNTER — OFFICE VISIT (OUTPATIENT)
Dept: PHYSICAL THERAPY | Facility: HOSPITAL | Age: 76
End: 2021-12-10
Attending: ORTHOPAEDIC SURGERY
Payer: MEDICARE

## 2021-12-10 PROCEDURE — 97140 MANUAL THERAPY 1/> REGIONS: CPT

## 2021-12-10 PROCEDURE — 97110 THERAPEUTIC EXERCISES: CPT

## 2021-12-10 NOTE — PROGRESS NOTES
Dx: Right rotator cuff syndrome. Insurance (Authorized # of Visits):  8           Authorizing Physician: Dr. Rhys Farrlel  Next MD visit: tbd. ..   Fall Risk: standard         Precautions: n/a              Subjective:   Rates right shoulder pain 1/10 supine R PUP 2 x 10. Supine AA protraction R 2 x 10.  2 x 10. Supine AA R protraction 2 x 10. Supine AA R protraction 2 x 10. Supine AA/A R protraction 2 x 10 with TC prn. . Supine A and AA R protraction 2 x 10.  2 x 10.   Supine R AA/A protraction 2 x 1 posterior capsule stretching by PT. Supine R shoulder posterior capsule stretching by PT. Supine right shoulder posterior capsule stretching by PT. Supine right shoulder posterior capsule stretching by PT.   X. Supine R shoulder posterior capsule stretch HEP: supine IR/ER actively and with wand, self inferior gliding, AA SF supine, and B SR. Charges: Manuela Pitch.        Total Timed Treatment: 40 min  Total Treatment Time: 40 min

## 2021-12-14 ENCOUNTER — APPOINTMENT (OUTPATIENT)
Dept: PHYSICAL THERAPY | Facility: HOSPITAL | Age: 76
End: 2021-12-14
Attending: ORTHOPAEDIC SURGERY
Payer: MEDICARE

## 2021-12-17 ENCOUNTER — OFFICE VISIT (OUTPATIENT)
Dept: PHYSICAL THERAPY | Facility: HOSPITAL | Age: 76
End: 2021-12-17
Attending: INTERNAL MEDICINE
Payer: MEDICARE

## 2021-12-17 PROCEDURE — 97110 THERAPEUTIC EXERCISES: CPT

## 2021-12-17 PROCEDURE — 97140 MANUAL THERAPY 1/> REGIONS: CPT

## 2021-12-17 NOTE — PROGRESS NOTES
Dx: Right rotator cuff syndrome. Insurance (Authorized # of Visits):  8           Authorizing Physician: Dr. Lyudmila Blankenship MD visit: tbd. ..   Fall Risk: standard         Precautions: n/a              Subjective:   Rates right shoulder pain 2/10 PUP 2 x 10. AA supine R PUP 2 x 10. Supine AA protraction R 2 x 10.  2 x 10. Supine AA R protraction 2 x 10. Supine AA R protraction 2 x 10. Supine AA/A R protraction 2 x 10 with TC prn. . Supine A and AA R protraction 2 x 10.  2 x 10.   Supine R AA/A p right shoulder PROM by PT. Supine right shoulder PROM by PT. PROM right shoulder while supine. PROM right shoulder while supine. R shoulder posterior capsule stretching by PT. Supine R shoulder posterior capsule stretching by PT.   Supine right shou stretching in door jamb. HEP.  HEP.    -             Seated B SR x 10 with cueing from PT. Seated B SR x 10 reps. .. X 10. With yellow band:   B SR 2 x 10. B SE 2 x 10. R IR x 10. R ER x 10. With yellow band:    R ER x 10. R IR x 10.    B SE x 20

## 2021-12-20 ENCOUNTER — OFFICE VISIT (OUTPATIENT)
Dept: PHYSICAL THERAPY | Facility: HOSPITAL | Age: 76
End: 2021-12-20
Attending: ORTHOPAEDIC SURGERY
Payer: MEDICARE

## 2021-12-20 PROCEDURE — 97140 MANUAL THERAPY 1/> REGIONS: CPT

## 2021-12-20 PROCEDURE — 97110 THERAPEUTIC EXERCISES: CPT

## 2021-12-20 NOTE — PROGRESS NOTES
Dx: Right rotator cuff syndrome. Insurance (Authorized # of Visits):  8           Authorizing Physician: Dr. John Blankenship MD visit: tbd. ..   Fall Risk: standard         Precautions: n/a              Subjective:   Rates right shoulder pain 0/10 2 x 10. AA supine R PUP 2 x 10. Supine AA protraction R 2 x 10.  2 x 10. Supine AA R protraction 2 x 10. Supine AA R protraction 2 x 10. Supine AA/A R protraction 2 x 10 with TC prn. . Supine A and AA R protraction 2 x 10.  2 x 10.   Supine R AA/A protr shoulder PROM by PT. Supine right shoulder PROM by PT. Supine right shoulder PROM by PT. Supine right shoulder PROM by PT. PROM right shoulder while supine. PROM right shoulder while supine. PROM right shoulder by PT while supine.     R shoulder poste B SE x 10. HEP. Seated cervical AROM. Seated cervical AROM. ... HEP.  HEP.  HEP.  HEP. R August's exercises; and as HEP.  HEP. R August's exercise. .. HEP.  HEP. R GH posterior stretching in door jamb.    HEP.  HEP.    -             Seated B SR x

## 2021-12-22 ENCOUNTER — APPOINTMENT (OUTPATIENT)
Dept: PHYSICAL THERAPY | Facility: HOSPITAL | Age: 76
End: 2021-12-22
Attending: ORTHOPAEDIC SURGERY
Payer: MEDICARE

## 2021-12-28 ENCOUNTER — APPOINTMENT (OUTPATIENT)
Dept: PHYSICAL THERAPY | Facility: HOSPITAL | Age: 76
End: 2021-12-28
Attending: ORTHOPAEDIC SURGERY
Payer: MEDICARE

## 2024-10-01 ENCOUNTER — TELEPHONE (OUTPATIENT)
Dept: PHYSICAL THERAPY | Facility: HOSPITAL | Age: 79
End: 2024-10-01

## 2024-10-01 ENCOUNTER — ORDER TRANSCRIPTION (OUTPATIENT)
Dept: PHYSICAL THERAPY | Facility: HOSPITAL | Age: 79
End: 2024-10-01

## 2024-10-01 DIAGNOSIS — M17.12 PRIMARY OSTEOARTHRITIS OF LEFT KNEE: Primary | ICD-10-CM

## 2024-10-02 ENCOUNTER — OFFICE VISIT (OUTPATIENT)
Dept: PHYSICAL THERAPY | Facility: HOSPITAL | Age: 79
End: 2024-10-02
Attending: ORTHOPAEDIC SURGERY
Payer: MEDICARE

## 2024-10-02 DIAGNOSIS — M76.60 INSERTIONAL ACHILLES TENDINOPATHY: ICD-10-CM

## 2024-10-02 DIAGNOSIS — M17.12 PRIMARY OSTEOARTHRITIS OF LEFT KNEE: Primary | ICD-10-CM

## 2024-10-02 PROCEDURE — 97162 PT EVAL MOD COMPLEX 30 MIN: CPT

## 2024-10-02 PROCEDURE — 97110 THERAPEUTIC EXERCISES: CPT

## 2024-10-02 NOTE — PROGRESS NOTES
LOWER EXTREMITY EVALUATION:     Diagnosis:   L knee pain  R achilles tendinopathy   Referring Provider: Eric Gomez  Date of Evaluation:    10/2/2024    Precautions:  Fall Risk Next MD visit:   none scheduled  Date of Surgery: n/a     PATIENT SUMMARY   Nora Euceda is a 78 year old female who presents to therapy today with complaints of L knee pain. Pt reports she has had pain in L knee due to OA for years. Had knee replacement on R knee about 5 years ago. States recovery from this surgery didn't go as smoothly as she hoped, so she is trying to put off surgery on L as long as possible. Pain in knee is anterior and pt describes it as sensation of \"bone on bone\". Pt also reports a \"pulling sensation\" in back of knee . Also reports achilles tendonitis on R LE. Her balance is also off, unsure if it is because of the pain or just general imbalance- she finds herself holding furniture/ walls when walking for stability. Does water aerobic x3/week. When she is in the water or sitting, she does not have any pain. Pain is worse when walking or when she first stands after a long period of time. She cannot kneel on the floor because she cannot get up due to pain and LE weakness. Lives with . Has sairs in house but does not have to use them consistently.  Pt describes pain level current 0/10, at best 0/10, at worst 6/10.   Current functional limitations include kneeling, walking.  Nora describes prior level of function pain for years. Pt goals include pain reduction.  Past medical history was reviewed with Nora. Significant findings include     Sprain of foot, left, initial encounter   Peroneal tendinitis of left lower extremity   Fracture, stress, metatarsal, left, initial encounter   Obesity, morbid (HCC)   IFG (impaired fasting glucose)   Nontraumatic complete tear of right rotator cuff   Heartburn   S/P knee replacement   Vitamin D deficiency   Osteoporosis   Chondromalacia of patella        ASSESSMENT  Nora presents to physical therapy evaluation with primary c/o L knee pain. The results of the objective tests and measures show decreased LE strength and ROM, and balance.  Functional deficits include but are not limited to instability and pain while walking.  Signs and symptoms are consistent with diagnosis of knee OA and achilles tendinopathy. Pt and PT discussed evaluation findings, pathology, POC and HEP.  Pt voiced understanding and performs HEP correctly without reported pain. Skilled Physical Therapy is medically necessary to address the above impairments and reach functional goals.     OBJECTIVE:   Observation: Walks w/ cane. Swelling on L knee and on top of R foot.   Palpation: Pt not TTP.     AROM: (* denotes performed with pain)  Knee Foot/Ankle   Flexion: R 125; L 130  Extension: R -2; L -12    DF: R 10; L 2       Accessory motion: NT this session     Flexibility:  Hip Flexor: R Slightly limited, L WNL  Hamstrings: R WNL; L WNL  Quads: R Slightly limited; L WNL  Gastroc-soleus: R limited; L limited    Strength/MMT: (* denotes performed with pain)  Hip Knee Foot/Ankle   Flexion: R 4+/5; L 4/5  Abduction: R 4/5; L 4/5  ER: R 4+/5; L 4/5   Flexion: R 4+/5; L 4/5  Extension: R 4+/5; L 4/5    DF: R 4+/5; L 4+/5  PF: R 4/5; L 5/5       Special tests:   Sl heel raises: unable to complete any w/ adequate height R/L    Gait: pt ambulates on level ground with lateral lean to R    Balance:   -Partial tandem: 30 sec R/L  -Tandem: ~7 second   -SL not tested     Today’s Treatment and Response:   Pt education was provided on exam findings, treatment diagnosis, treatment plan, expectations, and prognosis. Pt was also provided recommendations for importance of remaining active.  Patient was instructed in and issued a HEP for:   Access Code: [email or web address]  URL: [email or web address]  Date: 10/02/2024  Prepared by: Allyson Rubio    Exercises  - Supine Quadricep Sets  - 1 x daily - 7 x  weekly - 2 sets - 10 reps - 5 hold  - Long Sitting Ankle Plantar Flexion with Resistance  - 1 x daily - 7 x weekly - 2 sets - 20 reps  - Long Sitting Calf Stretch with Strap  - 1 x daily - 7 x weekly - 3 sets - 20 hold    Charges: PT Eval Moderate Complexity, x1 ther ex (15 min)      Total Timed Treatment: 15 min     Total Treatment Time: 40 min     Based on clinical rationale and outcome measures, this evaluation involved Moderate Complexity decision making due to 1-2 personal factors/comorbidities, 3 body structures involved/activity limitations, and evolving symptoms including changing pain levels.  PLAN OF CARE:    Goals: (to be met in 10 visits)  -Within 5 sessions, pt will be consistent and independent w/ HEP, thus facilitating recovery.   -Within 5 sessions, pt will report no greater than 3/10 pain on a daily basis.   -Within 10 sessions, pt will demonstrate at least 20 seconds of tandem stance, allowing her to safely navigate crowded places.   -Within 10 sessions, pt will demonstrate at least 4+/5 strength in all LE muscles, allowing her to walk longer periods of times w/o difficulty.   -Within 10 sessions, pt will display at least -1 degree of knee extension, or greater, on L knee, allowing for improved gait mechanics.        Frequency / Duration: Patient will be seen for 1-2 x/week or a total of 10 visits over a 90 day period. Treatment will include: Gait training, Manual Therapy, Mechanical Traction, Neuromuscular Re-education, Self-Care Home Management, Therapeutic Activities, Therapeutic Exercise, and Home Exercise Program instruction, ice/heat.    Education or treatment limitation: None  Rehab Potential:good    LEFS Score  LEFS Score: 40 % (10/2/2024 11:04 AM)      Patient/Family/Caregiver was advised of these findings, precautions, and treatment options and has agreed to actively participate in planning and for this course of care.    Thank you for your referral. Please co-sign or sign and return this  letter via fax as soon as possible to [phone]. If you have any questions, please contact me at Dept: [phone]    Sincerely,  Electronically signed by therapist: Allyson Rubio PT  Physician's certification required: Yes  I certify the need for these services furnished under this plan of treatment and while under my care.    X___________________________________________________ Date____________________    Certification From: 10/2/2024  To:12/31/2024

## 2024-10-04 ENCOUNTER — OFFICE VISIT (OUTPATIENT)
Dept: PHYSICAL THERAPY | Facility: HOSPITAL | Age: 79
End: 2024-10-04
Attending: ORTHOPAEDIC SURGERY
Payer: MEDICARE

## 2024-10-04 PROCEDURE — 97110 THERAPEUTIC EXERCISES: CPT

## 2024-10-04 PROCEDURE — 97140 MANUAL THERAPY 1/> REGIONS: CPT

## 2024-10-04 NOTE — PROGRESS NOTES
Diagnosis:   L knee pain  R achilles tendinopathy      Referring Provider: Eric Gomez  Date of Evaluation:    10/02    Precautions:  Fall Risk Next MD visit:   none scheduled  Date of Surgery: n/a   Insurance Primary/Secondary: MEDICARE / COMMERCIAL     # Auth Visits: medicare             Subjective: pt reports she has been well. Has not had any pain behind her knee. Only feels the bone on bone pain.     Pain: 0/10      Objective:   10/04:   Tandem: 12 sec R/L       Assessment: Pt tolerated session well. Difficulty and pain w/ lateral step ups on L, but able to perform forward step ups. Balance and motion fo bilateral LE impaired. Knee ext and flex w/ band added to HEP> Pt will continue to benefit from skilled PT in order to address deficits.       Goals:     -Within 5 sessions, pt will be consistent and independent w/ HEP, thus facilitating recovery.   -Within 5 sessions, pt will report no greater than 3/10 pain on a daily basis.   -Within 10 sessions, pt will demonstrate at least 20 seconds of tandem stance, allowing her to safely navigate crowded places.   -Within 10 sessions, pt will demonstrate at least 4+/5 strength in all LE muscles, allowing her to walk longer periods of times w/o difficulty.   -Within 10 sessions, pt will display at least -1 degree of knee extension, or greater, on L knee, allowing for improved gait mechanics.      Plan: progress as able.   Date: 10/4/2024  TX#: 2/6 Date:                 TX#: 3/ Date:                 TX#: 4/ Date:                 TX#: 5/ Date:   Tx#: 6/          Manual   Anterior and posteriro mobs to tib fib joint grd 1-3   Patellar mobs- all directions grd 1-3        Ther ex:   Nu-step 5 min lv 5   Knee ext and flex: w/ GTB in sitting x 15 R/L each   Side stepping w/ YTB x 20 Ft r/l   Lateral step up- attempted  Forward step ups: x15 on L: 4in step   Heel raises off edge : x 15   Tandem balance: multiple roudns                      HEP:   Access Code: [email or web address]  URL:  [email or web address]  Date: 10/04/2024  Prepared by: Allyson Rubio    Exercises  - Supine Quadricep Sets  - 1 x daily - 7 x weekly - 2 sets - 10 reps - 5 hold  - Long Sitting Ankle Plantar Flexion with Resistance  - 1 x daily - 7 x weekly - 2 sets - 20 reps  - Long Sitting Calf Stretch with Strap  - 1 x daily - 7 x weekly - 3 sets - 20 hold  - Seated Knee Extension with Resistance  - 1 x daily - 7 x weekly - 2 sets - 10 reps  - Seated Knee Flexion with Anchored Resistance  - 1 x daily - 7 x weekly - 2 sets - 10 reps    Charges: x1 manual (10 min ) x2 ther ex (30 min)        Total Timed Treatment: 40 min  Total Treatment Time: 40 min

## 2024-10-11 ENCOUNTER — OFFICE VISIT (OUTPATIENT)
Dept: PHYSICAL THERAPY | Facility: HOSPITAL | Age: 79
End: 2024-10-11
Attending: ORTHOPAEDIC SURGERY
Payer: MEDICARE

## 2024-10-11 PROCEDURE — 97110 THERAPEUTIC EXERCISES: CPT

## 2024-10-11 PROCEDURE — 97140 MANUAL THERAPY 1/> REGIONS: CPT

## 2024-10-11 NOTE — PROGRESS NOTES
Diagnosis:   L knee pain  R achilles tendinopathy      Referring Provider: Eric Gomez  Date of Evaluation:    10/02    Precautions:  Fall Risk Next MD visit:   none scheduled  Date of Surgery: n/a   Insurance Primary/Secondary: MEDICARE / COMMERCIAL     # Auth Visits: medicare             Subjective: pt reports she felt fine after last sessions, but had pain starting Saturday night in L knee. Knee ext caused pain for a few days. Feeling a little better now.    Pain: 2/10      Objective:   10/04:   Tandem: 12 sec R/L     10/11 tandem: 25 sec R/L       Assessment: Pt tolerated session well. Had increased pain in knee this session during knee ext, so this exercises was kept w/in non-painful range. Lateral step ups attmepted again; they were less painfull than last session, but still difficult due to weakness. Pt educated on continuing to perfom hip strength at home. Tandem balance has increased signifincantly.  Pt will continue to benefit from skilled PT in order to address deficits.       Goals:     -Within 5 sessions, pt will be consistent and independent w/ HEP, thus facilitating recovery.   -Within 5 sessions, pt will report no greater than 3/10 pain on a daily basis.   -Within 10 sessions, pt will demonstrate at least 20 seconds of tandem stance, allowing her to safely navigate crowded places.   -Within 10 sessions, pt will demonstrate at least 4+/5 strength in all LE muscles, allowing her to walk longer periods of times w/o difficulty.   -Within 10 sessions, pt will display at least -1 degree of knee extension, or greater, on L knee, allowing for improved gait mechanics.      Plan: progress as able.   Date: 10/4/2024  TX#: 2/6 Date:   10/11/24            TX#: 3/6 Date:                 TX#: 4/ Date:                 TX#: 5/ Date:   Tx#: 6/          Manual   Anterior and posteriro mobs to tib fib joint grd 1-3   Patellar mobs- all directions grd 1-3  Manual   Anterior and posteriro mobs to tib fib joint grd 1-3    Patellar mobs- all directions grd 1-3       Ther ex:   Nu-step 5 min lv 5   Knee ext and flex: w/ GTB in sitting x 15 R/L each   Side stepping w/ YTB x 20 Ft r/l   Lateral step up- attempted  Forward step ups: x15 on L: 4in step   Heel raises off edge : x 15   Tandem balance: multiple rounds Ther ex:   Nu-step 5 min lv 5    Quad sets w/ ankle on towel x10 w/ 5 sec hold   PF against BTB- x15 R/L   Small quad arc w/ 2.5 lbs x 15 R/L   X10 standing HS curls w/ 2.5 lbs   Forward step ups- x15 on 4in step on L  Lateral step ups- x5 on L w/ 4in step   Standing hip abd- w/ YTB x 15 R/L   Tandem balance                      HEP: Access Code: [email or web address]  URL: [email or web address]  Date: 10/11/2024  Prepared by: Allyson Rubio    Exercises  - Supine Quadricep Sets  - 1 x daily - 7 x weekly - 2 sets - 10 reps - 5 hold  - Long Sitting Ankle Plantar Flexion with Resistance  - 1 x daily - 7 x weekly - 2 sets - 20 reps  - Long Sitting Calf Stretch with Strap  - 1 x daily - 7 x weekly - 3 sets - 20 hold  - Seated Knee Extension with Resistance  - 1 x daily - 7 x weekly - 2 sets - 10 reps  - Seated Knee Flexion with Anchored Resistance  - 1 x daily - 7 x weekly - 2 sets - 10 reps  - Step Up  - 1 x daily - 7 x weekly - 2 sets - 10 reps  - Side Stepping with Resistance at Ankles and Counter Support  - 1 x daily - 7 x weekly - 2 sets - 20 reps  - Hip Abduction with Resistance Loop  - 1 x daily - 7 x weekly - 2 sets - 10 reps    Charges: x1 manual (10 min ) x2 ther ex (30 min)        Total Timed Treatment: 40 min  Total Treatment Time: 40 min

## 2024-10-14 ENCOUNTER — OFFICE VISIT (OUTPATIENT)
Dept: PHYSICAL THERAPY | Facility: HOSPITAL | Age: 79
End: 2024-10-14
Attending: ORTHOPAEDIC SURGERY
Payer: MEDICARE

## 2024-10-14 PROCEDURE — 97112 NEUROMUSCULAR REEDUCATION: CPT

## 2024-10-14 PROCEDURE — 97110 THERAPEUTIC EXERCISES: CPT

## 2024-10-14 PROCEDURE — 97140 MANUAL THERAPY 1/> REGIONS: CPT

## 2024-10-14 NOTE — PROGRESS NOTES
Diagnosis:   L knee pain  R achilles tendinopathy      Referring Provider: Eric Gomez  Date of Evaluation:    10/02    Precautions:  Fall Risk Next MD visit:   none scheduled  Date of Surgery: n/a   Insurance Primary/Secondary: MEDICARE / COMMERCIAL     # Auth Visits: medicare             Subjective: pt reports her knee was feeling better over the weekends.     Pain: 1/10      Objective:   10/04:   Tandem: 12 sec R/L     10/11 tandem: 25 sec R/L       Assessment: Pt tolerated session well. Symptoms have been decreasing. Demonstrates decreased step length during gait. This may be due to decreased SL balance or tightness in hip flexors. Tightness in hip flexors more significant in R vs left. Standing hip flexor stretch added to HEP.  Pt will continue to benefit from skilled PT in order to address deficits.       Goals:     -Within 5 sessions, pt will be consistent and independent w/ HEP, thus facilitating recovery.   -Within 5 sessions, pt will report no greater than 3/10 pain on a daily basis.   -Within 10 sessions, pt will demonstrate at least 20 seconds of tandem stance, allowing her to safely navigate crowded places.   -Within 10 sessions, pt will demonstrate at least 4+/5 strength in all LE muscles, allowing her to walk longer periods of times w/o difficulty.   -Within 10 sessions, pt will display at least -1 degree of knee extension, or greater, on L knee, allowing for improved gait mechanics.      Plan: progress as able.   Date: 10/4/2024  TX#: 2/6 Date:   10/11/24            TX#: 3/6 Date:    10/14/24           TX#: 4/6 Date:                 TX#: 5/ Date:   Tx#: 6/          Manual   Anterior and posteriro mobs to tib fib joint grd 1-3   Patellar mobs- all directions grd 1-3  Manual   Anterior and posteriro mobs to tib fib joint grd 1-3   Patellar mobs- all directions grd 1-3  Manual   Anterior and posteriro mobs to tib fib joint grd 1-3   Patellar mobs- all directions grd 1-3      Ther ex:   Nu-step 5 min lv 5    Knee ext and flex: w/ GTB in sitting x 15 R/L each   Side stepping w/ YTB x 20 Ft r/l   Lateral step up- attempted  Forward step ups: x15 on L: 4in step   Heel raises off edge : x 15   Tandem balance: multiple rounds Ther ex:   Nu-step 5 min lv 5    Quad sets w/ ankle on towel x10 w/ 5 sec hold   PF against BTB- x15 R/L   Small quad arc w/ 2.5 lbs x 15 R/L   X10 standing HS curls w/ 2.5 lbs   Forward step ups- x15 on 4in step on L  Lateral step ups- x5 on L w/ 4in step   Standing hip abd- w/ YTB x 15 R/L   Tandem balance   Ther ex:   Nu-step 5 min lv 5    Lateral band walk w/ YTB   Standign marches w/ YTB x 20 R/L   Lateral step -ups on 4in step x 15 R/L   Forward step ups x10 R/L   Step downs- 4in step x 10 R/L   Standing hip flexor stretch 2x30 sec R/L   Eccentric heel raises x 15 bilateral          Neuro re-ed   Tandem and SL balance: multiple rounds  [place] step over- x15 R/L decreasing UE support               HEP: Access Code: [email or web address]  URL: [email or web address]  Date: 10/11/2024  Prepared by: Allyson Rubio    Exercises  - Supine Quadricep Sets  - 1 x daily - 7 x weekly - 2 sets - 10 reps - 5 hold  - Long Sitting Ankle Plantar Flexion with Resistance  - 1 x daily - 7 x weekly - 2 sets - 20 reps  - Long Sitting Calf Stretch with Strap  - 1 x daily - 7 x weekly - 3 sets - 20 hold  - Seated Knee Extension with Resistance  - 1 x daily - 7 x weekly - 2 sets - 10 reps  - Seated Knee Flexion with Anchored Resistance  - 1 x daily - 7 x weekly - 2 sets - 10 reps  - Step Up  - 1 x daily - 7 x weekly - 2 sets - 10 reps  - Side Stepping with Resistance at Ankles and Counter Support  - 1 x daily - 7 x weekly - 2 sets - 20 reps  - Hip Abduction with Resistance Loop  - 1 x daily - 7 x weekly - 2 sets - 10 reps    Charges: x1 manual (10 min ) x2 ther ex (20 min)  x1 neuro re-ed (10 min)      Total Timed Treatment: 40 min  Total Treatment Time: 40 min

## 2024-10-21 ENCOUNTER — APPOINTMENT (OUTPATIENT)
Dept: PHYSICAL THERAPY | Facility: HOSPITAL | Age: 79
End: 2024-10-21
Attending: ORTHOPAEDIC SURGERY
Payer: MEDICARE

## 2024-10-21 ENCOUNTER — TELEPHONE (OUTPATIENT)
Dept: PHYSICAL THERAPY | Age: 79
End: 2024-10-21

## 2024-11-01 ENCOUNTER — OFFICE VISIT (OUTPATIENT)
Dept: PHYSICAL THERAPY | Facility: HOSPITAL | Age: 79
End: 2024-11-01
Attending: ORTHOPAEDIC SURGERY
Payer: MEDICARE

## 2024-11-01 PROCEDURE — 97140 MANUAL THERAPY 1/> REGIONS: CPT

## 2024-11-01 PROCEDURE — 97110 THERAPEUTIC EXERCISES: CPT

## 2024-11-01 PROCEDURE — 97112 NEUROMUSCULAR REEDUCATION: CPT

## 2024-11-01 NOTE — PROGRESS NOTES
Diagnosis:   L knee pain  R achilles tendinopathy      Referring Provider: Eric Gomez  Date of Evaluation:    10/02    Precautions:  Fall Risk Next MD visit:   none scheduled  Date of Surgery: n/a   Insurance Primary/Secondary: MEDICARE / COMMERCIAL     # Auth Visits: medicare             Subjective: pt reports her knee is feeling better. She hardly has any pain. Only has some stiffness when she wakes up in th middle of the night.   Pain: 0/10      Objective:   10/04:   Tandem: 12 sec R/L     10/11 tandem: 25 sec R/L   11/01:   Tandem: 30 sec R/L  SL balance: ~ 5 sec R/L       Assessment: Pt tolerated session well. SL and tandem balance has improved. Still has difficulty w/ eccentric quad control. This was noticeable during lateral wobble board activity and pt's difficulty w/ step downs. Pt also has some pain w/ eccentric aspect of TRX squats.  Pt will continue to benefit from skilled PT in order to address deficits.       Goals:     -Within 5 sessions, pt will be consistent and independent w/ HEP, thus facilitating recovery.   -Within 5 sessions, pt will report no greater than 3/10 pain on a daily basis.   -Within 10 sessions, pt will demonstrate at least 20 seconds of tandem stance, allowing her to safely navigate crowded places.   -Within 10 sessions, pt will demonstrate at least 4+/5 strength in all LE muscles, allowing her to walk longer periods of times w/o difficulty.   -Within 10 sessions, pt will display at least -1 degree of knee extension, or greater, on L knee, allowing for improved gait mechanics.      Plan: progress as able.   Date: 10/4/2024  TX#: 2/6 Date:   10/11/24            TX#: 3/6 Date:    10/14/24           TX#: 4/6 Date:   11/01/24             TX#: 5/6 Date:   Tx#: 6/          Manual   Anterior and posteriro mobs to tib fib joint grd 1-3   Patellar mobs- all directions grd 1-3  Manual   Anterior and posteriro mobs to tib fib joint grd 1-3   Patellar mobs- all directions grd 1-3  Manual    Anterior and posteriro mobs to tib fib joint grd 1-3   Patellar mobs- all directions grd 1-3  Manual   Anterior and posteriro mobs to tib fib joint grd 1-3   Patellar mobs- all directions grd 1-3     Ther ex:   Nu-step 5 min lv 5   Knee ext and flex: w/ GTB in sitting x 15 R/L each   Side stepping w/ YTB x 20 Ft r/l   Lateral step up- attempted  Forward step ups: x15 on L: 4in step   Heel raises off edge : x 15   Tandem balance: multiple rounds Ther ex:   Nu-step 5 min lv 5    Quad sets w/ ankle on towel x10 w/ 5 sec hold   PF against BTB- x15 R/L   Small quad arc w/ 2.5 lbs x 15 R/L   X10 standing HS curls w/ 2.5 lbs   Forward step ups- x15 on 4in step on L  Lateral step ups- x5 on L w/ 4in step   Standing hip abd- w/ YTB x 15 R/L   Tandem balance   Ther ex:   Nu-step 5 min lv 5    Lateral band walk w/ YTB   Standign marches w/ YTB x 20 R/L   Lateral step -ups on 4in step x 15 R/L   Forward step ups x10 R/L   Step downs- 4in step x 10 R/L   Standing hip flexor stretch 2x30 sec R/L   Eccentric heel raises x 15 bilateral   TRX squats  x10    Ther ex;  Nu-step 5 min lv 6  Lateral band walk w/ GTB x 2 laps // bars   Standing marches w/ YTB x 20 R/L   Step downs and lateral step ups on 4in step x 6 each - ceased due to pain and weakness  Step ups on 4in step x20 on L  Eccentric heel raises x 15 bilateral   TRX squats x 10       Neuro re-ed   Tandem and SL balance: multiple rounds  [place] step over- x15 R/L decreasing UE support    Neuro re-ed   Tandem and SL balance: multiple rounds  Wobble board AP and lateral weights shifts x 3 min each            HEP: Access Code: [email or web address]  URL: [email or web address]  Date: 10/11/2024  Prepared by: Allyson Rubio    Exercises  - Supine Quadricep Sets  - 1 x daily - 7 x weekly - 2 sets - 10 reps - 5 hold  - Long Sitting Ankle Plantar Flexion with Resistance  - 1 x daily - 7 x weekly - 2 sets - 20 reps  - Long Sitting Calf Stretch with Strap  - 1 x daily - 7 x weekly  - 3 sets - 20 hold  - Seated Knee Extension with Resistance  - 1 x daily - 7 x weekly - 2 sets - 10 reps  - Seated Knee Flexion with Anchored Resistance  - 1 x daily - 7 x weekly - 2 sets - 10 reps  - Step Up  - 1 x daily - 7 x weekly - 2 sets - 10 reps  - Side Stepping with Resistance at Ankles and Counter Support  - 1 x daily - 7 x weekly - 2 sets - 20 reps  - Hip Abduction with Resistance Loop  - 1 x daily - 7 x weekly - 2 sets - 10 reps    Charges: x1 manual (10 min ) x1 ther ex (20 min)  x1 neuro re-ed (10 min)      Total Timed Treatment: 40 min  Total Treatment Time: 40 min

## 2024-11-06 ENCOUNTER — APPOINTMENT (OUTPATIENT)
Dept: PHYSICAL THERAPY | Facility: HOSPITAL | Age: 79
End: 2024-11-06
Attending: ORTHOPAEDIC SURGERY
Payer: MEDICARE

## 2025-04-04 ENCOUNTER — APPOINTMENT (OUTPATIENT)
Dept: GENERAL RADIOLOGY | Facility: HOSPITAL | Age: 80
End: 2025-04-04
Attending: EMERGENCY MEDICINE
Payer: MEDICARE

## 2025-04-04 ENCOUNTER — HOSPITAL ENCOUNTER (OUTPATIENT)
Facility: HOSPITAL | Age: 80
Setting detail: OBSERVATION
Discharge: HOME | End: 2025-04-05
Attending: EMERGENCY MEDICINE | Admitting: INTERNAL MEDICINE
Payer: MEDICARE

## 2025-04-04 ENCOUNTER — APPOINTMENT (OUTPATIENT)
Dept: CT IMAGING | Facility: HOSPITAL | Age: 80
End: 2025-04-04
Attending: EMERGENCY MEDICINE
Payer: MEDICARE

## 2025-04-04 DIAGNOSIS — R07.9 CHEST PAIN OF UNCERTAIN ETIOLOGY: Primary | ICD-10-CM

## 2025-04-04 PROBLEM — R79.89 AZOTEMIA: Status: ACTIVE | Noted: 2025-04-04

## 2025-04-04 PROBLEM — R73.9 HYPERGLYCEMIA: Status: ACTIVE | Noted: 2025-04-04

## 2025-04-04 LAB
ALBUMIN SERPL-MCNC: 4.4 G/DL (ref 3.2–4.8)
ALBUMIN/GLOB SERPL: 1.7 {RATIO} (ref 1–2)
ALP LIVER SERPL-CCNC: 58 U/L
ALT SERPL-CCNC: 14 U/L
ANION GAP SERPL CALC-SCNC: 10 MMOL/L (ref 0–18)
AST SERPL-CCNC: 18 U/L (ref ?–34)
ATRIAL RATE: 70 BPM
ATRIAL RATE: 82 BPM
BASOPHILS # BLD AUTO: 0.03 X10(3) UL (ref 0–0.2)
BASOPHILS NFR BLD AUTO: 0.4 %
BILIRUB SERPL-MCNC: 0.3 MG/DL (ref 0.2–1.1)
BUN BLD-MCNC: 22 MG/DL (ref 9–23)
CALCIUM BLD-MCNC: 10.5 MG/DL (ref 8.7–10.6)
CHLORIDE SERPL-SCNC: 105 MMOL/L (ref 98–112)
CHOLEST SERPL-MCNC: 184 MG/DL (ref ?–200)
CO2 SERPL-SCNC: 27 MMOL/L (ref 21–32)
CREAT BLD-MCNC: 0.9 MG/DL
D DIMER PPP FEU-MCNC: 0.81 UG/ML FEU (ref ?–0.78)
EGFRCR SERPLBLD CKD-EPI 2021: 65 ML/MIN/1.73M2 (ref 60–?)
EOSINOPHIL # BLD AUTO: 0.06 X10(3) UL (ref 0–0.7)
EOSINOPHIL NFR BLD AUTO: 0.8 %
ERYTHROCYTE [DISTWIDTH] IN BLOOD BY AUTOMATED COUNT: 12.7 %
GLOBULIN PLAS-MCNC: 2.6 G/DL (ref 2–3.5)
GLUCOSE BLD-MCNC: 111 MG/DL (ref 70–99)
GLUCOSE BLD-MCNC: 119 MG/DL (ref 70–99)
HCT VFR BLD AUTO: 40.1 %
HDLC SERPL-MCNC: 73 MG/DL (ref 40–59)
HGB BLD-MCNC: 13.4 G/DL
IMM GRANULOCYTES # BLD AUTO: 0.02 X10(3) UL (ref 0–1)
IMM GRANULOCYTES NFR BLD: 0.3 %
LDLC SERPL CALC-MCNC: 96 MG/DL (ref ?–100)
LYMPHOCYTES # BLD AUTO: 2.09 X10(3) UL (ref 1–4)
LYMPHOCYTES NFR BLD AUTO: 27.8 %
MCH RBC QN AUTO: 32.1 PG (ref 26–34)
MCHC RBC AUTO-ENTMCNC: 33.4 G/DL (ref 31–37)
MCV RBC AUTO: 95.9 FL
MONOCYTES # BLD AUTO: 0.56 X10(3) UL (ref 0.1–1)
MONOCYTES NFR BLD AUTO: 7.5 %
NEUTROPHILS # BLD AUTO: 4.75 X10 (3) UL (ref 1.5–7.7)
NEUTROPHILS # BLD AUTO: 4.75 X10(3) UL (ref 1.5–7.7)
NEUTROPHILS NFR BLD AUTO: 63.2 %
NONHDLC SERPL-MCNC: 111 MG/DL (ref ?–130)
OSMOLALITY SERPL CALC.SUM OF ELEC: 298 MOSM/KG (ref 275–295)
P AXIS: 28 DEGREES
P AXIS: 50 DEGREES
P-R INTERVAL: 148 MS
P-R INTERVAL: 152 MS
PLATELET # BLD AUTO: 256 10(3)UL (ref 150–450)
POTASSIUM SERPL-SCNC: 4.4 MMOL/L (ref 3.5–5.1)
PROT SERPL-MCNC: 7 G/DL (ref 5.7–8.2)
Q-T INTERVAL: 390 MS
Q-T INTERVAL: 402 MS
QRS DURATION: 86 MS
QRS DURATION: 88 MS
QTC CALCULATION (BEZET): 434 MS
QTC CALCULATION (BEZET): 455 MS
R AXIS: -3 DEGREES
R AXIS: 14 DEGREES
RBC # BLD AUTO: 4.18 X10(6)UL
SODIUM SERPL-SCNC: 142 MMOL/L (ref 136–145)
T AXIS: 31 DEGREES
T AXIS: 54 DEGREES
TRIGL SERPL-MCNC: 83 MG/DL (ref 30–149)
TROPONIN I SERPL HS-MCNC: <3 NG/L
VENTRICULAR RATE: 70 BPM
VENTRICULAR RATE: 82 BPM
VLDLC SERPL CALC-MCNC: 14 MG/DL (ref 0–30)
WBC # BLD AUTO: 7.5 X10(3) UL (ref 4–11)

## 2025-04-04 PROCEDURE — 80053 COMPREHEN METABOLIC PANEL: CPT | Performed by: EMERGENCY MEDICINE

## 2025-04-04 PROCEDURE — 99285 EMERGENCY DEPT VISIT HI MDM: CPT

## 2025-04-04 PROCEDURE — 84484 ASSAY OF TROPONIN QUANT: CPT | Performed by: EMERGENCY MEDICINE

## 2025-04-04 PROCEDURE — 71275 CT ANGIOGRAPHY CHEST: CPT | Performed by: EMERGENCY MEDICINE

## 2025-04-04 PROCEDURE — 82962 GLUCOSE BLOOD TEST: CPT

## 2025-04-04 PROCEDURE — 36415 COLL VENOUS BLD VENIPUNCTURE: CPT

## 2025-04-04 PROCEDURE — 93005 ELECTROCARDIOGRAM TRACING: CPT

## 2025-04-04 PROCEDURE — 85025 COMPLETE CBC W/AUTO DIFF WBC: CPT

## 2025-04-04 PROCEDURE — 93010 ELECTROCARDIOGRAM REPORT: CPT

## 2025-04-04 PROCEDURE — 80061 LIPID PANEL: CPT | Performed by: STUDENT IN AN ORGANIZED HEALTH CARE EDUCATION/TRAINING PROGRAM

## 2025-04-04 PROCEDURE — 85025 COMPLETE CBC W/AUTO DIFF WBC: CPT | Performed by: EMERGENCY MEDICINE

## 2025-04-04 PROCEDURE — 71045 X-RAY EXAM CHEST 1 VIEW: CPT | Performed by: EMERGENCY MEDICINE

## 2025-04-04 PROCEDURE — 84484 ASSAY OF TROPONIN QUANT: CPT

## 2025-04-04 PROCEDURE — 85379 FIBRIN DEGRADATION QUANT: CPT | Performed by: EMERGENCY MEDICINE

## 2025-04-04 PROCEDURE — 80053 COMPREHEN METABOLIC PANEL: CPT

## 2025-04-04 RX ORDER — LISINOPRIL 10 MG/1
10 TABLET ORAL DAILY
COMMUNITY
Start: 2024-10-25

## 2025-04-04 RX ORDER — ASPIRIN 81 MG/1
81 TABLET, CHEWABLE ORAL DAILY
Status: DISCONTINUED | OUTPATIENT
Start: 2025-04-05 | End: 2025-04-05

## 2025-04-04 RX ORDER — LISINOPRIL 10 MG/1
10 TABLET ORAL DAILY
Status: DISCONTINUED | OUTPATIENT
Start: 2025-04-05 | End: 2025-04-05

## 2025-04-04 RX ORDER — MULTIVITAMIN WITH IRON
500 TABLET ORAL DAILY
COMMUNITY
End: 2025-04-04 | Stop reason: CLARIF

## 2025-04-04 RX ORDER — ONDANSETRON 2 MG/ML
4 INJECTION INTRAMUSCULAR; INTRAVENOUS EVERY 4 HOURS PRN
Status: CANCELLED | OUTPATIENT
Start: 2025-04-04 | End: 2025-04-04

## 2025-04-04 RX ORDER — LISINOPRIL 20 MG/1
10 TABLET ORAL DAILY
Status: DISCONTINUED | OUTPATIENT
Start: 2025-04-05 | End: 2025-04-04 | Stop reason: SDUPTHER

## 2025-04-04 RX ORDER — ENOXAPARIN SODIUM 100 MG/ML
40 INJECTION SUBCUTANEOUS DAILY
Status: DISCONTINUED | OUTPATIENT
Start: 2025-04-05 | End: 2025-04-05

## 2025-04-04 RX ORDER — ATORVASTATIN CALCIUM 40 MG/1
40 TABLET, FILM COATED ORAL NIGHTLY
Status: DISCONTINUED | OUTPATIENT
Start: 2025-04-04 | End: 2025-04-05

## 2025-04-04 RX ORDER — ASPIRIN 81 MG/1
324 TABLET, CHEWABLE ORAL ONCE
Status: COMPLETED | OUTPATIENT
Start: 2025-04-04 | End: 2025-04-04

## 2025-04-04 RX ORDER — ACETAMINOPHEN 500 MG
1000 TABLET ORAL ONCE
Status: COMPLETED | OUTPATIENT
Start: 2025-04-04 | End: 2025-04-04

## 2025-04-04 NOTE — ED INITIAL ASSESSMENT (HPI)
Pt arrives to ED for evaluation of chest tightness and in her back, upper. Pt states the pain has been intermittent for a couple of days. Pt denies vomiting, c/o some nausea. Pt denies SOB. Pt feeling fatigue from the chest tightness.

## 2025-04-04 NOTE — ED QUICK NOTES
Orders for admission, patient is aware of plan and ready to go upstairs. Any questions, please call ED RN Lucio at extension [phone].     Patient Covid vaccination status: Fully vaccinated     COVID Test Ordered in ED: None    COVID Suspicion at Admission: N/A    Running Infusions:  None    Mental Status/LOC at time of transport: AOx4    Other pertinent information:   CIWA score: N/A   NIH score:  N/A

## 2025-04-04 NOTE — ED PROVIDER NOTES
Patient Seen in: [facility] Emergency Department      History     Chief Complaint   Patient presents with    Chest Pain     Stated Complaint: CP    Subjective:   HPI      This is a 79-year-old female who arrives here with complaints of pain in her chest has been there for at least 3 days.  Pain is intermittent.  Its she says it is in her chest sometimes into the back.  She feels it can occur for several hours in a row.  She says it comes and goes is not activity related it is random.  Symptoms can last for several hours.  The patient denies any previous history of coronary disease.  Had a heart attack denies any history of blood clots.  Denies any recent travel.  Denies any calf pain denies any fever chills dizziness or lightheadedness.  She states she got a little nauseous with it but did not have any vomiting denies any shortness of breath she feels like she cannot take a full deep breath but is not having pain when she takes a deep breath.  She denies any calf pain or calf swelling.  Denies any other complaints.    Objective:     Past Medical History:    Disorder of liver    as a child age 11     Iron deficiency anemia, unspecified    OSTEOARTHRITIS    Osteoarthritis    Osteoporosis    vertebral fracture    Pelvic fracture (HCC)    PONV (postoperative nausea and vomiting)    nausea    Primary localized osteoarthrosis, lower leg    Visual impairment    glasses              Past Surgical History:   Procedure Laterality Date    Cholecystectomy      Colonoscopy  2008    Colonoscopy N/A 11/16/2017    Procedure: COLONOSCOPY, POSSIBLE BIOPSY, POSSIBLE POLYPECTOMY 16354;  Surgeon: Tu Vargas MD;  [place] [facility] [place] [facility]    Colonoscopy & polypectomy  2003    D & c  1999    x 2    Eye muscle surg proc unlisted  1962    Other surgical history  1990    gallbladder surgery    Removal gallbladder                  Social History     Socioeconomic History    Marital status:     Number of children: 2    Occupational History    Occupation: retired   Tobacco Use    Smoking status: Former     Current packs/day: 0.00     Average packs/day: 0.3 packs/day for 3.0 years (0.9 ttl pk-yrs)     Types: Cigarettes     Start date: 1967     Quit date: 1970     Years since quittin.2    Smokeless tobacco: Never   Substance and Sexual Activity    Alcohol use: No     Comment: <1 per month    Drug use: No    Sexual activity: Yes     Partners: Male   Other Topics Concern    Caffeine Concern Yes     Comment: 1 diet soda and 1 cup of coffee    Exercise Yes     Comment: 3-4 times weekly    Seat Belt Yes    Self-Exams Yes                  Physical Exam     ED Triage Vitals [25 1413]   /67   Pulse 75   Resp 16   Temp 97 °F (36.1 °C)   Temp src Temporal   SpO2 93 %   O2 Device None (Room air)       Current Vitals:   Vital Signs  BP: 149/60  Pulse: 75  Resp: 17  Temp: 97 °F (36.1 °C)  Temp src: Temporal  MAP (mmHg): 86    Oxygen Therapy  SpO2: 99 %  O2 Device: None (Room air)        Physical Exam  General: .  Patient is in no respiratory distress at this present time.  She is in no pain.  The patient is in no respiratory distress    HEENT: Atraumatic, conjunctiva are not pale.  There is no icterus.  Oral mucosa Is wet.  No facial trauma.  The neck is supple.    LUNGS: Clear to auscultation, there is no wheezing or retraction.  No crackles.    CV: Cardiovascular is regular without murmurs or rubs.    ABD: The abdomen is soft nondistended nontender.  There is no rebound.  There is no guarding.    EXT: There is good pulses bilaterally.  There is no calf tenderness.  There is no rash noted.  There is no edema  She has a bruise of her left shin where she injured herself with a vacuum  she has no calf pain.  There is no redness or cellulitis at that site is little hematoma to the left anterior shin.  NEURO: Alert and oriented x4.  Muscle strength and sensory exam is grossly normal.  And the patient is  neurologically intact with no focal findings.      ED Course     Labs Reviewed   COMP METABOLIC PANEL (14) - Abnormal; Notable for the following components:       Result Value    Glucose 111 (*)     Calculated Osmolality 298 (*)     All other components within normal limits   D-DIMER - Abnormal; Notable for the following components:    D-Dimer 0.81 (*)     All other components within normal limits   TROPONIN I HIGH SENSITIVITY - Normal   CBC WITH DIFFERENTIAL WITH PLATELET   RAINBOW DRAW BLUE               The patient was placed on monitors, IV was started, blood was drawn.    Workup was done to rule out acute coronary syndrome, PE was considered.    Electrolyte imbalance, pneumonia.       MDM      The EKG shows normal sinus rhythm.  There is no acute ST elevations or ischemic findings.  The rest of the EKG including rate rhythm axis and intervals I agree with the EKG report . The rate is 82 there is PVCs noted but overall the QRS morphology is unchanged from November 1996 EKG      Admission disposition: 4/4/2025  5:34 PM       The EKG shows normal sinus rhythm.  There is no acute ST elevations or ischemic findings.  The rest of the EKG including rate rhythm axis and intervals I agree with the EKG report . The rate is 70.  The QRS duration is 88.  No significant change from previous    The patient's troponin was negative, D-dimer was slightly elevated.  CBC and comprehensive was grossly negative.  The patient did get a chest x-ray which did show abnormalities in the left-sided chart heart area possibly COVID hiatal hernia versus pneumopericardium was considered by the radiologist.  CT was ordered.      I personally reviewed the radiographs and my individual interpretation shows      Hiatal hernia noted.  No PE  Also reviewed official report and it shows      CTA CHEST (CPT=71275)    Result Date: 4/4/2025  PROCEDURE:  CT ANGIOGRAPHY, CHEST (CPT=71275)  COMPARISON:  [facility] , XR, XR CHEST AP PORTABLE  (CPT=71045),  4/04/2025, 4:04 PM.  INDICATIONS:  CP  TECHNIQUE:  IV contrast-enhanced multislice CT angiography is performed through the pulmonary arterial anatomy. 3D volume renderings are generated.  Dose reduction techniques were used. Dose information is transmitted to the ACR (American College of Radiology) NRDR (National Radiology Data Registry) which includes the Dose Index Registry.  PATIENT STATED HISTORY:(As transcribed by Technologist)  Patient stated she's had general chest pain appearing out of nowhere. Described no trouble breathing or shortness of breath.   CONTRAST USED:  100cc of Isovue 370  FINDINGS:  VASCULATURE:  No visible pulmonary arterial thrombus or attenuation.  THORACIC AORTA:  Atherosclerotic disease without aneurysm LUNGS:  Left basilar atelectasis. MEDIASTINUM:  Right thyroid lobe nodule measuring up to 1.0 cm.  No mediastinal lymphadenopathy.  There is a large hiatal hernia containing the majority of the stomach, which accounts for findings on prior chest radiograph. PATY:  No mass or adenopathy.  CARDIAC:  No enlargement, pericardial thickening, or significant coronary artery calcification.  No pneumoperitoneum. PLEURA:  No mass or effusion.  CHEST WALL:  No mass or axillary adenopathy.  LIMITED ABDOMEN:  Limited images of the upper abdomen are unremarkable.  BONES:  Degenerative changes of the spine. OTHER:  Negative.             CONCLUSION:  1. Large hiatal hernia containing the majority of the stomach, which accounts for prior chest radiograph findings. 2. No evidence of pulmonary embolism. 3. No acute pulmonary findings. 4. Thyroid nodule measuring up to 1.0 cm. Recommend further evaluation with thyroid ultrasound.    LOCATION:  [place]   Dictated by (CST): Denzel Huggins MD on 4/04/2025 at 5:05 PM     Finalized by (CST): Denzel Huggins MD on 4/04/2025 at 5:10 PM       XR CHEST AP PORTABLE  (CPT=71045)    Result Date: 4/4/2025  PROCEDURE:  XR CHEST AP PORTABLE  (CPT=71045)  TECHNIQUE:  AP chest  radiograph was obtained.  COMPARISON:  [facility] , XR, XR CHEST PA + LAT CHEST (SSV=50220), 9/21/2018, 12:56 PM.  External Exams, XR, CHEST PA   LATERAL, 2/25/2012, 5:31 PM.  INDICATIONS:  CP  PATIENT STATED HISTORY: (As transcribed by Technologist)  Patint states shes been having chest pain for a couple of days.    FINDINGS:  There is crescentic density along the left heart border with adjacent lucency, which could reflect pneumopericardium.  Recommend further evaluation with CT chest.  Otherwise no focal consolidation.  No pleural effusion.  No pneumothorax.  No acute osseous findings.            CONCLUSION:  See above  COMMUNICATION:  The findings were communicated with Dr. Hill at 16 20 on 4/4/2025. There was appropriate read back.   LOCATION:  [facility]      Dictated by (CST): Denzel Huggins MD on 4/04/2025 at 4:17 PM     Finalized by (CST): Denzel Huggins MD on 4/04/2025 at 4:20 PM      The patient was given aspirin, Tylenol.  Patient will need to be admitted for serial cardiac enzymes and a stress test as the patient's pain has been there for last several days and she is here for persistent symptoms no acute acute coronary syndrome at this present time repeat EKG was done which did not show anything acute.  Discussed this case with the [facility] hospitalist, [facility].  Dr. Parisi, Dr. Sykes was consulted.  Patient will be admitted for further evaluation treatment serial cardiac enzymes.  He is does state that she is unsure if she can do a whole treadmill as long as there is something holding as she may be able to but she may need further evaluation.  Medical Decision Making      Disposition and Plan     Clinical Impression:  1. Chest pain of uncertain etiology         Disposition:  Admit  4/4/2025  5:34 pm    Follow-up:  No follow-up provider specified.        Medications Prescribed:  Current Discharge Medication List              Supplementary Documentation:         Hospital Problems       Present on  Admission  Date Reviewed: 12/8/2021            ICD-10-CM Noted POA    * (Principal) Chest pain of uncertain etiology R07.9 4/4/2025 Unknown    Azotemia R79.89 4/4/2025 Yes    Hyperglycemia R73.9 4/4/2025 Yes

## 2025-04-04 NOTE — H&P
[facility]      Chief Complaint   Patient presents with    Chest Pain        PCP: Bassem De Dios MD    History of Present Illness: Patient is a 79 year old female with history of hiatal hernia, hypertension and type 2 diabetes who presented for chest pain.  Patient reports the pain began about 3 days ago.  She describes pressure-like sensation central chest radiating to her back.  Notes it occurs and intermittent episodes.  No associated dyspnea, diaphoresis, headache or dizziness, peripheral edema, syncope or falls.      In the ER, patient hemodynamically stable.  Labs fairly unremarkable.  Initial troponin negative.  CTA negative for PE, did note a large hiatal hernia containing majority of the stomach.  Patient given aspirin and admitted for further management.    Medical History:  Past Medical History:    Disorder of liver    as a child age 11     Iron deficiency anemia, unspecified    OSTEOARTHRITIS    Osteoarthritis    Osteoporosis    vertebral fracture    Pelvic fracture (HCC)    PONV (postoperative nausea and vomiting)    nausea    Primary localized osteoarthrosis, lower leg    Visual impairment    glasses      Past Surgical History:   Procedure Laterality Date    Cholecystectomy      Colonoscopy      Colonoscopy N/A 2017    Procedure: COLONOSCOPY, POSSIBLE BIOPSY, POSSIBLE POLYPECTOMY 19099;  Surgeon: Tu Vargas MD;  [place] [facility] [place] [facility]    Colonoscopy & polypectomy      D & c  1999    x 2    Eye muscle surg proc unlisted      Other surgical history      gallbladder surgery    Removal gallbladder        Social History     Tobacco Use    Smoking status: Former     Current packs/day: 0.00     Average packs/day: 0.3 packs/day for 3.0 years (0.9 ttl pk-yrs)     Types: Cigarettes     Start date: 1967     Quit date: 1970     Years since quittin.2    Smokeless tobacco: Never   Substance Use Topics    Alcohol use: No      Comment: <1 per month      Family History   Problem Relation Age of Onset    Cancer Mother     Breast Cancer Mother 72        breast cancer 72    Other (osteoporosis) Mother     Heart Disorder Father         cad 59    Diabetes Father     Heart Attack Father         MI     Heart Attack Paternal Grandfather         MI    Heart Attack Paternal Grandmother         MI    Breast Cancer Sister 63        age at dx 63    Heart Disorder Brother         cad 56       Allergies[1]     Review of Systems  Comprehensive ROS reviewed and negative except for what is stated in HPI.      OBJECTIVE:  /60   Pulse 75   Temp 97 °F (36.1 °C) (Temporal)   Resp 17   Ht 5' 8\" (1.727 m)   Wt 180 lb (81.6 kg)   LMP  (LMP Unknown)   SpO2 99%   BMI 27.37 kg/m²   General: No apparent distress.  Alert and oriented.  HEENT:  EOMI, PERRLA.  Pulm: Clear breath sounds bilaterally.  Normal respiratory effort.  CV: Regular rate and rhythm, no murmur.   Abd: Soft, nontender, nondistended.   MSK: Full range of motion in extremities, no obvious deformities.    Skin: No lesions or rashes.  Neuro: No obvious focal deficits.    Data Review:    LABS:   Lab Results   Component Value Date    WBC 7.5 04/04/2025    HGB 13.4 04/04/2025    HCT 40.1 04/04/2025    .0 04/04/2025    CREATSERUM 0.90 04/04/2025    BUN 22 04/04/2025     04/04/2025    K 4.4 04/04/2025     04/04/2025    CO2 27.0 04/04/2025     04/04/2025    CA 10.5 04/04/2025    ALB 4.4 04/04/2025    ALKPHO 58 04/04/2025    BILT 0.3 04/04/2025    TP 7.0 04/04/2025    AST 18 04/04/2025    ALT 14 04/04/2025    DDIMER 0.81 04/04/2025       All lab work and imaging personally reviewed.    Assessment/Plan:     Assessment/ Plan:     #Atypical chest pain  -CTA negative for PE, troponin negative, chest pain intermittently occurring-currently asymptomatic  -Obtain echo  -aspirin, statin. Obtain lipid panel.   -Cardiology on consult, appreciate further recommendations.  Did  discuss that symptoms may be partially due to large hernia involving the stomach. Will consider GI consultation depending above results.     #Large hiatal hernia  -as above    #HTN  -resume home lisinopril    #Type II DM  -insulin SSI    DVT ppx: lovenox  Diet: regular  Disposition: obs overnight     Outpatient records or previous hospital records reviewed.   [facility] hospitalist to continue to follow patient while in house.    DO Albino Arnold [facility] Hospitalist       [1]   Allergies  Allergen Reactions    Morphine NAUSEA AND VOMITING    Tramadol NAUSEA ONLY    Aleve [Naprelan] HIVES and RASH     Caps;    Codeine      STOMACH UPSET     Vicodin [Hydrocodone-Acetaminophen]      STOMACH UPSET

## 2025-04-05 ENCOUNTER — APPOINTMENT (OUTPATIENT)
Dept: CV DIAGNOSTICS | Facility: HOSPITAL | Age: 80
End: 2025-04-05
Attending: STUDENT IN AN ORGANIZED HEALTH CARE EDUCATION/TRAINING PROGRAM
Payer: MEDICARE

## 2025-04-05 ENCOUNTER — APPOINTMENT (OUTPATIENT)
Dept: CV DIAGNOSTICS | Facility: HOSPITAL | Age: 80
End: 2025-04-05
Attending: INTERNAL MEDICINE
Payer: MEDICARE

## 2025-04-05 VITALS
HEART RATE: 80 BPM | HEIGHT: 67 IN | TEMPERATURE: 98 F | WEIGHT: 201.63 LBS | SYSTOLIC BLOOD PRESSURE: 111 MMHG | RESPIRATION RATE: 14 BRPM | DIASTOLIC BLOOD PRESSURE: 57 MMHG | BODY MASS INDEX: 31.65 KG/M2 | OXYGEN SATURATION: 98 %

## 2025-04-05 LAB
ANION GAP SERPL CALC-SCNC: 8 MMOL/L (ref 0–18)
BUN BLD-MCNC: 24 MG/DL (ref 9–23)
CALCIUM BLD-MCNC: 9.9 MG/DL (ref 8.7–10.6)
CHLORIDE SERPL-SCNC: 105 MMOL/L (ref 98–112)
CO2 SERPL-SCNC: 29 MMOL/L (ref 21–32)
CREAT BLD-MCNC: 0.87 MG/DL
EGFRCR SERPLBLD CKD-EPI 2021: 68 ML/MIN/1.73M2 (ref 60–?)
ERYTHROCYTE [DISTWIDTH] IN BLOOD BY AUTOMATED COUNT: 12.7 %
GLUCOSE BLD-MCNC: 132 MG/DL (ref 70–99)
GLUCOSE BLD-MCNC: 88 MG/DL (ref 70–99)
HCT VFR BLD AUTO: 38.3 %
HGB BLD-MCNC: 12.6 G/DL
MCH RBC QN AUTO: 31 PG (ref 26–34)
MCHC RBC AUTO-ENTMCNC: 32.9 G/DL (ref 31–37)
MCV RBC AUTO: 94.1 FL
OSMOLALITY SERPL CALC.SUM OF ELEC: 300 MOSM/KG (ref 275–295)
PLATELET # BLD AUTO: 242 10(3)UL (ref 150–450)
POTASSIUM SERPL-SCNC: 4.2 MMOL/L (ref 3.5–5.1)
RBC # BLD AUTO: 4.07 X10(6)UL
SODIUM SERPL-SCNC: 142 MMOL/L (ref 136–145)
WBC # BLD AUTO: 7.4 X10(3) UL (ref 4–11)

## 2025-04-05 PROCEDURE — 93306 TTE W/DOPPLER COMPLETE: CPT | Performed by: STUDENT IN AN ORGANIZED HEALTH CARE EDUCATION/TRAINING PROGRAM

## 2025-04-05 PROCEDURE — 82962 GLUCOSE BLOOD TEST: CPT

## 2025-04-05 PROCEDURE — 93018 CV STRESS TEST I&R ONLY: CPT | Performed by: INTERNAL MEDICINE

## 2025-04-05 PROCEDURE — 96372 THER/PROPH/DIAG INJ SC/IM: CPT

## 2025-04-05 PROCEDURE — 78452 HT MUSCLE IMAGE SPECT MULT: CPT | Performed by: INTERNAL MEDICINE

## 2025-04-05 PROCEDURE — 93017 CV STRESS TEST TRACING ONLY: CPT | Performed by: INTERNAL MEDICINE

## 2025-04-05 PROCEDURE — 80048 BASIC METABOLIC PNL TOTAL CA: CPT | Performed by: STUDENT IN AN ORGANIZED HEALTH CARE EDUCATION/TRAINING PROGRAM

## 2025-04-05 PROCEDURE — 85027 COMPLETE CBC AUTOMATED: CPT | Performed by: STUDENT IN AN ORGANIZED HEALTH CARE EDUCATION/TRAINING PROGRAM

## 2025-04-05 RX ORDER — IBUPROFEN 400 MG/1
400 TABLET, FILM COATED ORAL ONCE
Status: DISCONTINUED | OUTPATIENT
Start: 2025-04-05 | End: 2025-04-05

## 2025-04-05 NOTE — PLAN OF CARE
Assumed care ar 0730. A&O X4, RA, VSS, NSR on tele. Cardiac electrolyte replacement protocol. Lab draw. PIV in R FA flushed and capped. Dressing CDI. Lovenox for VTE. Cardiac stress test diet, pills whole with thin. Echo and stress test done today. Continent x2, up SBA. Abrasion to L shin. Up ad uriel. Accu checks QID.  at bedside, questions answered.     Problem: CARDIOVASCULAR - ADULT  Goal: Maintains optimal cardiac output and hemodynamic stability  Description: INTERVENTIONS:- Monitor vital signs, rhythm, and trends- Monitor for bleeding, hypotension and signs of decreased cardiac output- Evaluate effectiveness of vasoactive medications to optimize hemodynamic stability- Monitor arterial and/or venous puncture sites for bleeding and/or hematoma- Assess quality of pulses, skin color and temperature- Assess for signs of decreased coronary artery perfusion - ex. Angina- Evaluate fluid balance, assess for edema, trend weights  Outcome: Progressing  Goal: Absence of cardiac arrhythmias or at baseline  Description: INTERVENTIONS:- Continuous cardiac monitoring, monitor vital signs, obtain 12 lead EKG if indicated- Evaluate effectiveness of antiarrhythmic and heart rate control medications as ordered- Initiate emergency measures for life threatening arrhythmias- Monitor electrolytes and administer replacement therapy as ordered  Outcome: Progressing

## 2025-04-05 NOTE — PROGRESS NOTES
NURSING ADMISSION NOTE      Patient admitted via Cart  Oriented to room.  Safety precautions initiated.  Bed in low position.  Call light in reach.    Pt oriented x 4   Placed tele and    Denies CP, vitals stable, room air   Updated pt on poc and verbalizes understanding  Pending ECHO   
    NURSING DISCHARGE NOTE    Discharged Home via Ambulatory.  Accompanied by Family member  Belongings Taken by patient/family.    Discharge AVS reviewed with pt, who verbalized understanding. PIV, tele, and pulse ox removed.   
3 = A little assistance

## 2025-04-05 NOTE — CONSULTS
[facility] [place]    Nora Euceda Patient Status:  Observation    1945 MRN [id number]   Location [facility] 3NE-A Attending Gabriel Snow MD   Hosp Day # 0 PCP Bassem De Dios MD     Reason for Consultation: Chest pain      History of Present Illness:  Nora Euceda is a a(n) 79 year old female.  71-year-old patient history hypertension diabetes presents with chest discomfort.  Patient's chest pain pressure-like radiation to her back not related to her chronic back pain.  Patient states that last minutes up to 30 minutes at a time.  No nausea vomiting diaphoresis fever chills cough wheeze congestion.  Initial testing CT scan troponins normal and EKG no changes.    History:  Past Medical History:    Disorder of liver    as a child age 11     Iron deficiency anemia, unspecified    OSTEOARTHRITIS    Osteoarthritis    Osteoporosis    vertebral fracture    Pelvic fracture (HCC)    PONV (postoperative nausea and vomiting)    nausea    Primary localized osteoarthrosis, lower leg    Visual impairment    glasses     Past Surgical History:   Procedure Laterality Date    Cholecystectomy      Colonoscopy      Colonoscopy N/A 2017    Procedure: COLONOSCOPY, POSSIBLE BIOPSY, POSSIBLE POLYPECTOMY 56796;  Surgeon: Tu Vargas MD;  [place] [facility] [place] [facility]    Colonoscopy & polypectomy      D & c  1999    x 2    Eye muscle surg proc unlisted      Other surgical history      gallbladder surgery    Removal gallbladder       Family History   Problem Relation Age of Onset    Cancer Mother     Breast Cancer Mother 72        breast cancer 72    Other (osteoporosis) Mother     Heart Disorder Father         cad 59    Diabetes Father     Heart Attack Father         MI     Heart Attack Paternal Grandfather         MI    Heart Attack Paternal Grandmother         MI    Breast Cancer Sister 63        age at dx 63    Heart Disorder Brother         cad 56          Allergies:  Allergies[1]    Medications:   enoxaparin  40 mg Subcutaneous Daily    aspirin  81 mg Oral Daily    insulin aspart  1-5 Units Subcutaneous TID AC and HS    atorvastatin  40 mg Oral Nightly    lisinopril  10 mg Oral Daily       Continuous Infusions:      Social History:   reports that she quit smoking about 55 years ago. Her smoking use included cigarettes. She started smoking about 58 years ago. She has a 0.9 pack-year smoking history. She has never used smokeless tobacco. She reports current alcohol use. She reports that she does not use drugs.    Review of Systems:  All systems were reviewed and are negative except as described above in HPI.    Physical Exam:      Temp:  [97 °F (36.1 °C)-98.2 °F (36.8 °C)] 98.2 °F (36.8 °C)  Pulse:  [63-75] 63  Resp:  [12-17] 17  BP: (106-149)/(57-70) 112/59  SpO2:  [93 %-100 %] 97 %    Wt Readings from Last 3 Encounters:   04/04/25 201 lb 9.6 oz (91.4 kg)   12/08/21 230 lb (104.3 kg)   10/28/21 240 lb (108.9 kg)       General: No apparent distress  HEENT: No focal deficits.  Neck: supple. JVP normal  Cardiac: Regular rhythm, S1, S2 normal,  rub or gallop.  No murmur.  Lungs: CTA  Abdomen: Soft, non-tender.   Extremities: No edema  Neurologic: no focal deficits  Skin: Warm and dry.          Telemetry:     Laboratories and Data:  Diagnostics:    EKG, 4/5/2025:      CXR, 4/5/2025:      Labs:   HEM:  Recent Labs   Lab 04/04/25  1428   WBC 7.5   HGB 13.4   .0       Chem:  Recent Labs   Lab 04/04/25  1428      K 4.4      CO2 27.0   BUN 22   CREATSERUM 0.90   CA 10.5   *       Recent Labs   Lab 04/04/25  1428   ALT 14   AST 18   ALB 4.4       No results for input(s): \"PT\", \"INR\" in the last 168 hours.    No results for input(s): \"TROP\" in the last 168 hours.          Impression:    1.  Chest pain       No associated acute EKG change       Normal       CT scan large hiatal hernia no pulmonary embolism       No aneurysm detected       No  pericardial thickening or coronary artery calcification detected           Recommend:    1.  Get a stress test to assess for ischemia        If normal consider noncardiac source of symptoms possibly musculoskeletal    2.  There is echo ordered will review    3.  Hypertension controlled with lisinopril continue same     Further recommendations after stress test complete            Mika Sykes MD  4/5/2025  7:00 AM        [1]   Allergies  Allergen Reactions    Morphine NAUSEA AND VOMITING    Tramadol NAUSEA ONLY    Aleve [Naprelan] HIVES and RASH     Caps;    Codeine      STOMACH UPSET     Vicodin [Hydrocodone-Acetaminophen]      STOMACH UPSET

## 2025-04-05 NOTE — SPIRITUAL CARE NOTE
Spiritual Care Visit Note    Patient Name: Nora Euceda Date of Spiritual Care Visit: 25   : 1945 Primary Dx: Chest pain of uncertain etiology   Referred By:  [facility]    Spiritual Care Taxonomy:    Intended Effects: Demonstrate caring and concern    Methods: Encourage sharing of feelings, Encourage story-telling, Offer support    Interventions: Acknowledge current situation, Active listening, Ask questions to bring forth feelings, Identify supportive relationship(s), Discuss plan of care, Share words of hope and inspiration    Visit Type/Summary:  Noar was sitting up in bed with no visitors when  arrived at her ED room.  She seemed in good spirits, was glad that she had come to the ED, but felt the symptoms which brought her to the ED were likely not from a heart issue, of which she was glad.  She felt she would have to visit with other doctors in the morning to determine cause of her symptoms.   - Spiritual Care: Consulted with RN prior to visit. Nora and  prayed together.  She expressed appreciation for  visit. Provided information regarding how to contact Spiritual Care.  remains available as needed for follow up.    Spiritual Care support can be requested via an Epic consult. For urgent/immediate needs, please contact the On Call  at: [facility]: ext [phone]    Josie Casillas MDiv.  Staff

## (undated) DEVICE — SYRINGE 50ML LL TIP

## (undated) DEVICE — POLAR CARE CUBE COOLING UNIT

## (undated) DEVICE — SPECIMEN CONTAINER,POSITIVE SEAL INDICATOR, OR PACKAGED: Brand: PRECISION

## (undated) DEVICE — 3M™ COBAN™ NL STERILE NON-LATEX SELF-ADHERENT WRAP, 2084S, 4 IN X 5 YD (10 CM X 4,5 M), 18 ROLLS/CASE: Brand: 3M™ COBAN™

## (undated) DEVICE — TOTAL KNEE CDS: Brand: MEDLINE INDUSTRIES, INC.

## (undated) DEVICE — GAUZE SPONGES,12 PLY: Brand: CURITY

## (undated) DEVICE — Device: Brand: STABLECUT®

## (undated) DEVICE — DECANTER BAG 9": Brand: MEDLINE INDUSTRIES, INC.

## (undated) DEVICE — 3M™ MICROFOAM™ TAPE 1528-4: Brand: 3M™ MICROFOAM™

## (undated) DEVICE — SOL  .9 1000ML BTL

## (undated) DEVICE — PLASTC TOOMEY SYRNG DISP

## (undated) DEVICE — SUTURE MONOCRYL 3-0 PS-2

## (undated) DEVICE — PADDING CAST COTTON  4

## (undated) DEVICE — STERILE POLYISOPRENE POWDER-FREE SURGICAL GLOVES: Brand: PROTEXIS

## (undated) DEVICE — STERILE PATIENT PROTECTIVE PAD FOR IMP® KNEE POSITIONERS & COHESIVE WRAP (10 / CASE): Brand: DE MAYO KNEE POSITIONER®

## (undated) DEVICE — HOOD, PEEL-AWAY: Brand: FLYTE

## (undated) DEVICE — ZIMMER® STERILE DISPOSABLE TOURNIQUET CUFF WITH PLC, DUAL PORT, SINGLE BLADDER, 34 IN. (86 CM)

## (undated) DEVICE — ALCOHOL 70% 4 OZ

## (undated) DEVICE — CONVERTORS STOCKINETTE: Brand: CONVERTORS

## (undated) DEVICE — ANTIBACTERIAL UNDYED BRAIDED (POLYGLACTIN 910), SYNTHETIC ABSORBABLE SUTURE: Brand: COATED VICRYL

## (undated) DEVICE — CHLORAPREP 26ML APPLICATOR

## (undated) DEVICE — SUPER SPONGES,MEDIUM: Brand: KERLIX

## (undated) DEVICE — DERMABOND LIQUID ADHESIVE

## (undated) DEVICE — DRESSING AQUACEL AG 3.5 X 10

## (undated) DEVICE — 3M™ STERI-DRAPE™ U-DRAPE 1015: Brand: STERI-DRAPE™

## (undated) DEVICE — SOL  .9 3000ML

## (undated) DEVICE — BOWL CEMENT MIX QUICK-VAC

## (undated) DEVICE — DRAPE,U/SHT,SPLIT,FILM,60X84,STERILE: Brand: MEDLINE

## (undated) DEVICE — STANDARD HYPODERMIC NEEDLE,POLYPROPYLENE HUB: Brand: MONOJECT

## (undated) DEVICE — ANTIBACTERIAL VIOLET BRAIDED (POLYGLACTIN 910), SYNTHETIC ABSORBABLE SUTURE: Brand: COATED VICRYL

## (undated) DEVICE — PAD POLAR CARE KNEE/SHOULDER

## (undated) DEVICE — GLOVE SURG SENSICARE SZ 6-1/2

## (undated) NOTE — LETTER
[facility] Testing Department  Phone: [phone]  OUTSIDE TESTING RESULT REQUEST      TO:  Hernan Stone Today's Date: 8/31/18    FAX #: [phone]     IMPORTANT: FOR YOUR IMMEDIATE ATTENTION  Please FAX all test results listed below to: [phone]-